# Patient Record
Sex: MALE | Race: WHITE | Employment: OTHER | URBAN - METROPOLITAN AREA
[De-identification: names, ages, dates, MRNs, and addresses within clinical notes are randomized per-mention and may not be internally consistent; named-entity substitution may affect disease eponyms.]

---

## 2017-02-13 ENCOUNTER — HOSPITAL ENCOUNTER (INPATIENT)
Age: 77
LOS: 1 days | Discharge: HOME | DRG: 378 | End: 2017-02-15
Attending: EMERGENCY MEDICINE | Admitting: INTERNAL MEDICINE
Payer: COMMERCIAL

## 2017-02-13 DIAGNOSIS — K92.2 UPPER GI BLEED: Primary | ICD-10-CM

## 2017-02-13 DIAGNOSIS — N17.9 ACUTE KIDNEY INJURY (HCC): ICD-10-CM

## 2017-02-13 LAB
ALBUMIN SERPL BCP-MCNC: 3.7 G/DL (ref 3.2–4.6)
ALBUMIN/GLOB SERPL: 0.8 {RATIO} (ref 1.2–3.5)
ALP SERPL-CCNC: 97 U/L (ref 50–136)
ALT SERPL-CCNC: 21 U/L (ref 12–65)
ANION GAP BLD CALC-SCNC: 14 MMOL/L (ref 7–16)
AST SERPL W P-5'-P-CCNC: 16 U/L (ref 15–37)
BASOPHILS # BLD AUTO: 0.1 K/UL (ref 0–0.2)
BASOPHILS # BLD: 1 % (ref 0–2)
BILIRUB DIRECT SERPL-MCNC: 0.1 MG/DL
BILIRUB SERPL-MCNC: 0.4 MG/DL (ref 0.2–1.1)
BUN SERPL-MCNC: 52 MG/DL (ref 8–23)
CALCIUM SERPL-MCNC: 8.9 MG/DL (ref 8.3–10.4)
CHLORIDE SERPL-SCNC: 104 MMOL/L (ref 98–107)
CO2 SERPL-SCNC: 21 MMOL/L (ref 21–32)
CREAT SERPL-MCNC: 1.89 MG/DL (ref 0.8–1.5)
DIFFERENTIAL METHOD BLD: ABNORMAL
EOSINOPHIL # BLD: 0.5 K/UL (ref 0–0.8)
EOSINOPHIL NFR BLD: 5 % (ref 0.5–7.8)
ERYTHROCYTE [DISTWIDTH] IN BLOOD BY AUTOMATED COUNT: 13.6 % (ref 11.9–14.6)
GLOBULIN SER CALC-MCNC: 4.4 G/DL (ref 2.3–3.5)
GLUCOSE SERPL-MCNC: 195 MG/DL (ref 65–100)
HCT VFR BLD AUTO: 36.7 % (ref 41.1–50.3)
HGB BLD-MCNC: 12 G/DL (ref 13.6–17.2)
IMM GRANULOCYTES # BLD: 0 K/UL (ref 0–0.5)
IMM GRANULOCYTES NFR BLD AUTO: 0.3 % (ref 0–5)
LYMPHOCYTES # BLD AUTO: 32 % (ref 13–44)
LYMPHOCYTES # BLD: 3.4 K/UL (ref 0.5–4.6)
MCH RBC QN AUTO: 30.2 PG (ref 26.1–32.9)
MCHC RBC AUTO-ENTMCNC: 32.7 G/DL (ref 31.4–35)
MCV RBC AUTO: 92.2 FL (ref 79.6–97.8)
MONOCYTES # BLD: 0.8 K/UL (ref 0.1–1.3)
MONOCYTES NFR BLD AUTO: 7 % (ref 4–12)
NEUTS SEG # BLD: 5.8 K/UL (ref 1.7–8.2)
NEUTS SEG NFR BLD AUTO: 55 % (ref 43–78)
PLATELET # BLD AUTO: 396 K/UL (ref 150–450)
PMV BLD AUTO: 10.2 FL (ref 10.8–14.1)
POTASSIUM SERPL-SCNC: 4 MMOL/L (ref 3.5–5.1)
PROT SERPL-MCNC: 8.1 G/DL (ref 6.3–8.2)
RBC # BLD AUTO: 3.98 M/UL (ref 4.23–5.67)
SODIUM SERPL-SCNC: 139 MMOL/L (ref 136–145)
WBC # BLD AUTO: 10.6 K/UL (ref 4.3–11.1)

## 2017-02-13 PROCEDURE — 80076 HEPATIC FUNCTION PANEL: CPT | Performed by: EMERGENCY MEDICINE

## 2017-02-13 PROCEDURE — 86900 BLOOD TYPING SEROLOGIC ABO: CPT | Performed by: EMERGENCY MEDICINE

## 2017-02-13 PROCEDURE — 93005 ELECTROCARDIOGRAM TRACING: CPT | Performed by: EMERGENCY MEDICINE

## 2017-02-13 PROCEDURE — 74011250636 HC RX REV CODE- 250/636: Performed by: EMERGENCY MEDICINE

## 2017-02-13 PROCEDURE — 99285 EMERGENCY DEPT VISIT HI MDM: CPT | Performed by: EMERGENCY MEDICINE

## 2017-02-13 PROCEDURE — 96360 HYDRATION IV INFUSION INIT: CPT | Performed by: EMERGENCY MEDICINE

## 2017-02-13 PROCEDURE — 80048 BASIC METABOLIC PNL TOTAL CA: CPT | Performed by: EMERGENCY MEDICINE

## 2017-02-13 PROCEDURE — 85025 COMPLETE CBC W/AUTO DIFF WBC: CPT | Performed by: EMERGENCY MEDICINE

## 2017-02-13 RX ADMIN — SODIUM CHLORIDE 1000 ML: 900 INJECTION, SOLUTION INTRAVENOUS at 23:42

## 2017-02-13 NOTE — IP AVS SNAPSHOT
[address] [place] [address] 
[phone] Patient: Gilbert Mcgee MRN: [id number] :1940 You are allergic to the following No active allergies Recent Documentation Height Weight BMI Smoking Status 1.803 m 112.9 kg 34.73 kg/m2 Never Smoker Emergency Contacts Name Discharge Info Relation Home Work Mobile Gracia Spence  Spouse [3] [phone] About your hospitalization You were admitted on:  2017 You last received care in the:  [address] You were discharged on:  February 15, 2017 Unit phone number:  [phone] Why you were hospitalized Your primary diagnosis was:  Upper Gi Bleed Your diagnoses also included:  Type Ii Diabetes Mellitus (Hcc), Christopher (Acute Kidney Injury) (Hcc) Providers Seen During Your Hospitalizations Provider Role Specialty Primary office phone Kg Fernandez MD Attending Provider Emergency Medicine [phone] [facility] Demetrio, DO Attending Provider Internal Medicine [phone] Your Primary Care Physician (PCP) Primary Care Physician Office Phone Office Fax João  [phone] Follow-up Information Follow up With Details Comments Contact Info Lucas Bryant MD On 2017 APPT. Muna Hope.  @ 10:00 91 [facility] [address] [place] [address] 
[phone] Neeta Preciado MD In 1 week  [address] [facility] [address] [facility] [address] 
[phone] Current Discharge Medication List  
  
START taking these medications Dose & Instructions Dispensing Information Comments Morning Noon Evening Bedtime  
 ondansetron hcl 4 mg tablet Commonly known as:  Leonora Ring Dose:  4 mg Take 1 Tab by mouth every eight (8) hours as needed for Nausea. Quantity:  10 Tab Refills:  0  
     
   
   
   
  
 pantoprazole 40 mg tablet Commonly known as:  PROTONIX Your next dose is:  Tomorrow Dose:  40 mg Take 1 Tab by mouth daily. Quantity:  30 Tab Refills:  0  
     
  
   
   
   
  
 sucralfate 100 mg/mL suspension Commonly known as:  Tunde Garbe Your next dose is: Today Dose:  1 g Take 10 mL by mouth Before breakfast, lunch, and dinner for 30 days. Quantity:  900 mL Refills:  0 CONTINUE these medications which have NOT CHANGED Dose & Instructions Dispensing Information Comments Morning Noon Evening Bedtime DAILY MULTI-VITAMINS/IRON tablet Generic drug:  multivitamin with iron Your next dose is:  Tomorrow Dose:  1 Tab Take 1 Tab by mouth daily. [facility] vitamin pack Refills:  0 GLIMEPIRIDE PO Your next dose is:  Tomorrow Dose:  2 mg Take 2 mg by mouth daily. Refills:  0  
     
  
   
   
   
  
 linagliptin 5 mg tablet Commonly known as:  Authur Vito Your next dose is:  Tomorrow Dose:  5 mg Take 5 mg by mouth daily. Refills:  0  
     
  
   
   
   
  
 lisinopril 10 mg tablet Commonly known as:  Donnald Code Your next dose is:  Tomorrow Dose:  10 mg Take 10 mg by mouth daily. Refills:  0 METFORMIN PO Your next dose is: Today Dose:  1000 mg Take 1,000 mg by mouth two (2) times a day. Refills:  0 MUCINEX 600 mg SR tablet Generic drug:  guaiFENesin SR Your next dose is: Today Dose:  600 mg Take 600 mg by mouth every evening. Refills:  0  
     
   
   
  
   
  
 simvastatin 20 mg tablet Commonly known as:  ZOCOR Your next dose is: Today Dose:  20 mg Take 20 mg by mouth nightly. Refills:  0 Triamcinolone Acetonide 55 mcg/actuation Aero nasal spray Commonly known as:  NASACORT AQ  
 Your next dose is:  Tomorrow Dose:  1 Spray 1 Villalba by Both Nostrils route daily. Refills:  0 STOP taking these medications   
 aspirin 81 mg tablet  
   
  
 ibuprofen 200 mg tablet Commonly known as:  MOTRIN Where to Get Your Medications Information on where to get these meds will be given to you by the nurse or doctor. ! Ask your nurse or doctor about these medications  
  ondansetron hcl 4 mg tablet  
 pantoprazole 40 mg tablet  
 sucralfate 100 mg/mL suspension Discharge Instructions DISCHARGE SUMMARY from Nurse The following personal items are in your possession at time of discharge: 
 
Dental Appliances: None Visual Aid: None Home Medications: None Clothing: Pants, Shirt, Footwear, Undergarments, Socks Other Valuables: Cell Phone, Eyeglasses, Other (comment) (watch) PATIENT INSTRUCTIONS: 
 
After general anesthesia or intravenous sedation, for 24 hours or while taking prescription Narcotics: · Limit your activities · Do not drive and operate hazardous machinery · Do not make important personal or business decisions · Do  not drink alcoholic beverages · If you have not urinated within 8 hours after discharge, please contact your surgeon on call. Report the following to your surgeon: 
· Excessive pain, swelling, redness or odor of or around the surgical area · Temperature over 100.5 · Nausea and vomiting lasting longer than 4 hours or if unable to take medications · Any signs of decreased circulation or nerve impairment to extremity: change in color, persistent  numbness, tingling, coldness or increase pain · Any questions What to do at Home: 
Recommended activity: Activity as tolerated, per MD 
 
If you experience any of the following symptoms return or worsening of black stools, fever>101, pain unrelieved with medication, nausea/vomiting, shortness of breath, dizziness/fainting, chest pain. , please follow up with your doctor. *  Please give a list of your current medications to your Primary Care Provider. *  Please update this list whenever your medications are discontinued, doses are 
    changed, or new medications (including over-the-counter products) are added. *  Please carry medication information at all times in case of emergency situations. These are general instructions for a healthy lifestyle: No smoking/ No tobacco products/ Avoid exposure to second hand smoke Surgeon General's Warning:  Quitting smoking now greatly reduces serious risk to your health. Obesity, smoking, and sedentary lifestyle greatly increases your risk for illness A healthy diet, regular physical exercise & weight monitoring are important for maintaining a healthy lifestyle You may be retaining fluid if you have a history of heart failure or if you experience any of the following symptoms:  Weight gain of 3 pounds or more overnight or 5 pounds in a week, increased swelling in our hands or feet or shortness of breath while lying flat in bed. Please call your doctor as soon as you notice any of these symptoms; do not wait until your next office visit. Recognize signs and symptoms of STROKE: 
 
F-face looks uneven A-arms unable to move or move unevenly S-speech slurred or non-existent T-time-call 911 as soon as signs and symptoms begin-DO NOT go Back to bed or wait to see if you get better-TIME IS BRAIN. Warning Signs of HEART ATTACK Call 911 if you have these symptoms: 
? Chest discomfort. Most heart attacks involve discomfort in the center of the chest that lasts more than a few minutes, or that goes away and comes back. It can feel like uncomfortable pressure, squeezing, fullness, or pain. ? Discomfort in other areas of the upper body.  Symptoms can include pain or discomfort in one or both arms, the back, neck, jaw, or stomach. ? Shortness of breath with or without chest discomfort. ? Other signs may include breaking out in a cold sweat, nausea, or lightheadedness. Don't wait more than five minutes to call [address]! Fast action can save your life. Calling 911 is almost always the fastest way to get lifesaving treatment. Emergency Medical Services staff can begin treatment when they arrive  up to an hour sooner than if someone gets to the hospital by car. The discharge information has been reviewed with the patient. The patient verbalized understanding. Discharge medications reviewed with the patient and appropriate educational materials and side effects teaching were provided. Upper Gastrointestinal Bleeding: Care Instructions Your Care Instructions The digestive or gastrointestinal tract goes from the mouth to the anus. It is often called the GI tract. Bleeding in the upper GI tract can happen anywhere from the esophagus to the first part of the small intestine. Sometimes it's caused by an ulcer in your stomach. Or it may be caused by blood vessels in your esophagus. Your esophagus is the tube that carries food from your throat to your stomach. Light bleeding may not cause any symptoms at first. But if you continue to bleed for a while, you may feel very weak or tired. Sudden, heavy bleeding means you need to see a doctor right away. This kind of bleeding can be very dangerous. But it can usually be cured or controlled. The doctor may do some tests to find the cause of your bleeding. Follow-up care is a key part of your treatment and safety. Be sure to make and go to all appointments, and call your doctor if you are having problems. It's also a good idea to know your test results and keep a list of the medicines you take. How can you care for yourself at home? · Be safe with medicines. Take your medicines exactly as prescribed. Call your doctor if you think you are having a problem with your medicine. You will get more details on the specific medicines your doctor prescribes. · Do not take aspirin or other anti-inflammatory medicines, such as naproxen (Aleve) or ibuprofen (Advil, Motrin), without talking to your doctor first. Ask your doctor if it is okay to use acetaminophen (Tylenol). · Do not drink alcohol. · The bleeding may make you lose iron. So it's important to eat foods that have a lot of iron. These include red meat, shellfish, poultry, and eggs. They also include beans, raisins, whole-grain breads, and leafy green vegetables. If you want help planning meals, you can meet with a dietitian. When should you call for help? Call 911 anytime you think you may need emergency care. For example, call if: 
· You have sudden, severe belly pain. · You vomit blood or what looks like coffee grounds. · You passed out (lost consciousness). · Your stools are maroon or very bloody. Call your doctor now or seek immediate medical care if: 
· You are dizzy or lightheaded, or you feel like you may faint. · Your stools are black and look like tar. · You have belly pain. · You vomit or have nausea. · You have trouble swallowing, or it hurts when you swallow. Watch closely for changes in your health, and be sure to contact your doctor if you do not get better as expected. Where can you learn more? Go to http://shane-henrietta.info/. Enter Y827 in the search box to learn more about \"Upper Gastrointestinal Bleeding: Care Instructions. \" Current as of: May 27, 2016 Content Version: 11.1 © 0124-2283 [employer]. Care instructions adapted under license by [employer] (which disclaims liability or warranty for this information).  If you have questions about a medical condition or this instruction, always ask your healthcare professional. [place] [address] any warranty or liability for your use of this information. Discharge Orders None Beetailer Announcement We are excited to announce that we are making your provider's discharge notes available to you in Beetailer. You will see these notes when they are completed and signed by the physician that discharged you from your recent hospital stay. If you have any questions or concerns about any information you see in Beetailer, please call the Health Information Department where you were seen or reach out to your Primary Care Provider for more information about your plan of care. Introducing [facility]! Libby Baltazar introduces Beetailer patient portal. Now you can access parts of your medical record, email your doctor's office, and request medication refills online. 1. In your internet browser, go to [email or web address] 2. Click on the First Time User? Click Here link in the Sign In box. You will see the New Member Sign Up page. 3. Enter your Beetailer Access Code exactly as it appears below. You will not need to use this code after youve completed the sign-up process. If you do not sign up before the expiration date, you must request a new code. · Beetailer Access Code: [email or web address] Expires: 5/14/2017  2:23 PM 
 
4. Enter the last four digits of your Social Security Number (xxxx) and Date of Birth (mm/dd/yyyy) as indicated and click Submit. You will be taken to the next sign-up page. 5. Create a Beetailer ID. This will be your Beetailer login ID and cannot be changed, so think of one that is secure and easy to remember. 6. Create a Beetailer password. You can change your password at any time. 7. Enter your Password Reset Question and Answer. This can be used at a later time if you forget your password. 8. Enter your e-mail address.  You will receive e-mail notification when new information is available in Moodsnapt. 9. Click Sign Up. You can now view and download portions of your medical record. 10. Click the Download Summary menu link to download a portable copy of your medical information. If you have questions, please visit the Frequently Asked Questions section of the [employer] website. Remember, [employer] is NOT to be used for urgent needs. For medical emergencies, dial 911. Now available from your iPhone and Android! General Information Please provide this summary of care documentation to your next provider. Patient Signature:  ____________________________________________________________ Date:  ____________________________________________________________  
  
[employer] Provider Signature:  ____________________________________________________________ Date:  ____________________________________________________________

## 2017-02-14 ENCOUNTER — HOSPITAL ENCOUNTER (OUTPATIENT)
Age: 77
Setting detail: OUTPATIENT SURGERY
Discharge: HOME | End: 2017-02-14
Attending: INTERNAL MEDICINE | Admitting: INTERNAL MEDICINE
Payer: COMMERCIAL

## 2017-02-14 ENCOUNTER — ANESTHESIA EVENT (OUTPATIENT)
Dept: ENDOSCOPY | Age: 77
End: 2017-02-14
Payer: COMMERCIAL

## 2017-02-14 ENCOUNTER — SURGERY (OUTPATIENT)
Age: 77
End: 2017-02-14

## 2017-02-14 ENCOUNTER — ANESTHESIA (OUTPATIENT)
Dept: ENDOSCOPY | Age: 77
End: 2017-02-14
Payer: COMMERCIAL

## 2017-02-14 VITALS
RESPIRATION RATE: 16 BRPM | SYSTOLIC BLOOD PRESSURE: 150 MMHG | DIASTOLIC BLOOD PRESSURE: 70 MMHG | TEMPERATURE: 98.6 F | OXYGEN SATURATION: 94 % | HEART RATE: 83 BPM

## 2017-02-14 PROBLEM — E11.9 TYPE II DIABETES MELLITUS (HCC): Status: ACTIVE | Noted: 2017-02-14

## 2017-02-14 PROBLEM — K92.2 UPPER GI BLEED: Status: ACTIVE | Noted: 2017-02-14

## 2017-02-14 PROBLEM — N17.9 AKI (ACUTE KIDNEY INJURY) (HCC): Status: ACTIVE | Noted: 2017-02-14

## 2017-02-14 LAB
ABO + RH BLD: NORMAL
ATRIAL RATE: 101 BPM
BLOOD GROUP ANTIBODIES SERPL: NORMAL
CALCULATED P AXIS, ECG09: NORMAL DEGREES
CALCULATED R AXIS, ECG10: -163 DEGREES
CALCULATED T AXIS, ECG11: 109 DEGREES
DIAGNOSIS, 93000: NORMAL
DIASTOLIC BP, ECG02: NORMAL MMHG
GLUCOSE BLD STRIP.AUTO-MCNC: 116 MG/DL (ref 65–100)
GLUCOSE BLD STRIP.AUTO-MCNC: 118 MG/DL (ref 65–100)
GLUCOSE BLD STRIP.AUTO-MCNC: 224 MG/DL (ref 65–100)
HGB BLD-MCNC: 10 G/DL (ref 13.6–17.2)
P-R INTERVAL, ECG05: 152 MS
Q-T INTERVAL, ECG07: 360 MS
QRS DURATION, ECG06: 100 MS
QTC CALCULATION (BEZET), ECG08: 466 MS
SPECIMEN EXP DATE BLD: NORMAL
SYSTOLIC BP, ECG01: NORMAL MMHG
VENTRICULAR RATE, ECG03: 101 BPM

## 2017-02-14 PROCEDURE — 82962 GLUCOSE BLOOD TEST: CPT

## 2017-02-14 PROCEDURE — C9113 INJ PANTOPRAZOLE SODIUM, VIA: HCPCS | Performed by: INTERNAL MEDICINE

## 2017-02-14 PROCEDURE — 74011250636 HC RX REV CODE- 250/636: Performed by: ANESTHESIOLOGY

## 2017-02-14 PROCEDURE — 74011000250 HC RX REV CODE- 250: Performed by: INTERNAL MEDICINE

## 2017-02-14 PROCEDURE — 74011250636 HC RX REV CODE- 250/636: Performed by: EMERGENCY MEDICINE

## 2017-02-14 PROCEDURE — 76060000031 HC ANESTHESIA FIRST 0.5 HR: Performed by: INTERNAL MEDICINE

## 2017-02-14 PROCEDURE — 76040000025: Performed by: INTERNAL MEDICINE

## 2017-02-14 PROCEDURE — 74011636637 HC RX REV CODE- 636/637: Performed by: NURSE PRACTITIONER

## 2017-02-14 PROCEDURE — 74011000258 HC RX REV CODE- 258: Performed by: EMERGENCY MEDICINE

## 2017-02-14 PROCEDURE — C9113 INJ PANTOPRAZOLE SODIUM, VIA: HCPCS | Performed by: EMERGENCY MEDICINE

## 2017-02-14 PROCEDURE — C9113 INJ PANTOPRAZOLE SODIUM, VIA: HCPCS | Performed by: NURSE PRACTITIONER

## 2017-02-14 PROCEDURE — 74011250637 HC RX REV CODE- 250/637: Performed by: NURSE PRACTITIONER

## 2017-02-14 PROCEDURE — 65270000029 HC RM PRIVATE

## 2017-02-14 PROCEDURE — 74011250636 HC RX REV CODE- 250/636: Performed by: INTERNAL MEDICINE

## 2017-02-14 PROCEDURE — 74011250636 HC RX REV CODE- 250/636

## 2017-02-14 PROCEDURE — 74011000250 HC RX REV CODE- 250

## 2017-02-14 PROCEDURE — 85018 HEMOGLOBIN: CPT | Performed by: INTERNAL MEDICINE

## 2017-02-14 PROCEDURE — 36415 COLL VENOUS BLD VENIPUNCTURE: CPT | Performed by: INTERNAL MEDICINE

## 2017-02-14 PROCEDURE — 74011250636 HC RX REV CODE- 250/636: Performed by: NURSE PRACTITIONER

## 2017-02-14 RX ORDER — LISINOPRIL 5 MG/1
10 TABLET ORAL DAILY
Status: DISCONTINUED | OUTPATIENT
Start: 2017-02-15 | End: 2017-02-15 | Stop reason: HOSPADM

## 2017-02-14 RX ORDER — ALBUTEROL SULFATE 0.83 MG/ML
2.5 SOLUTION RESPIRATORY (INHALATION) AS NEEDED
Status: DISCONTINUED | OUTPATIENT
Start: 2017-02-14 | End: 2017-02-15 | Stop reason: HOSPADM

## 2017-02-14 RX ORDER — MIDAZOLAM HYDROCHLORIDE 1 MG/ML
2 INJECTION, SOLUTION INTRAMUSCULAR; INTRAVENOUS
Status: DISCONTINUED | OUTPATIENT
Start: 2017-02-14 | End: 2017-02-14 | Stop reason: HOSPADM

## 2017-02-14 RX ORDER — SODIUM CHLORIDE 0.9 % (FLUSH) 0.9 %
5-10 SYRINGE (ML) INJECTION AS NEEDED
Status: DISCONTINUED | OUTPATIENT
Start: 2017-02-14 | End: 2017-02-14 | Stop reason: HOSPADM

## 2017-02-14 RX ORDER — HYDROMORPHONE HYDROCHLORIDE 2 MG/ML
0.5 INJECTION, SOLUTION INTRAMUSCULAR; INTRAVENOUS; SUBCUTANEOUS
Status: CANCELLED | OUTPATIENT
Start: 2017-02-14

## 2017-02-14 RX ORDER — MIDAZOLAM HYDROCHLORIDE 1 MG/ML
2 INJECTION, SOLUTION INTRAMUSCULAR; INTRAVENOUS ONCE
Status: DISCONTINUED | OUTPATIENT
Start: 2017-02-14 | End: 2017-02-14 | Stop reason: HOSPADM

## 2017-02-14 RX ORDER — SODIUM CHLORIDE, SODIUM LACTATE, POTASSIUM CHLORIDE, CALCIUM CHLORIDE 600; 310; 30; 20 MG/100ML; MG/100ML; MG/100ML; MG/100ML
100 INJECTION, SOLUTION INTRAVENOUS CONTINUOUS
Status: CANCELLED | OUTPATIENT
Start: 2017-02-14

## 2017-02-14 RX ORDER — SODIUM CHLORIDE 9 MG/ML
100 INJECTION, SOLUTION INTRAVENOUS CONTINUOUS
Status: DISCONTINUED | OUTPATIENT
Start: 2017-02-14 | End: 2017-02-14 | Stop reason: HOSPADM

## 2017-02-14 RX ORDER — SUCRALFATE 1 G/10ML
1 SUSPENSION ORAL
Status: DISCONTINUED | OUTPATIENT
Start: 2017-02-14 | End: 2017-02-15 | Stop reason: HOSPADM

## 2017-02-14 RX ORDER — ONDANSETRON 2 MG/ML
4 INJECTION INTRAMUSCULAR; INTRAVENOUS
Status: DISCONTINUED | OUTPATIENT
Start: 2017-02-14 | End: 2017-02-14 | Stop reason: HOSPADM

## 2017-02-14 RX ORDER — SODIUM CHLORIDE 9 MG/ML
100 INJECTION, SOLUTION INTRAVENOUS CONTINUOUS
Status: DISCONTINUED | OUTPATIENT
Start: 2017-02-14 | End: 2017-02-15 | Stop reason: HOSPADM

## 2017-02-14 RX ORDER — NALOXONE HYDROCHLORIDE 0.4 MG/ML
0.1 INJECTION, SOLUTION INTRAMUSCULAR; INTRAVENOUS; SUBCUTANEOUS AS NEEDED
Status: DISCONTINUED | OUTPATIENT
Start: 2017-02-14 | End: 2017-02-15 | Stop reason: HOSPADM

## 2017-02-14 RX ORDER — OXYCODONE HYDROCHLORIDE 5 MG/1
10 TABLET ORAL
Status: CANCELLED | OUTPATIENT
Start: 2017-02-14

## 2017-02-14 RX ORDER — PANTOPRAZOLE SODIUM 40 MG/10ML
INJECTION, POWDER, LYOPHILIZED, FOR SOLUTION INTRAVENOUS
Status: DISCONTINUED
Start: 2017-02-14 | End: 2017-02-14 | Stop reason: HOSPADM

## 2017-02-14 RX ORDER — SIMVASTATIN 20 MG/1
20 TABLET, FILM COATED ORAL
Status: DISCONTINUED | OUTPATIENT
Start: 2017-02-14 | End: 2017-02-15 | Stop reason: HOSPADM

## 2017-02-14 RX ORDER — ONDANSETRON 2 MG/ML
4 INJECTION INTRAMUSCULAR; INTRAVENOUS ONCE
Status: CANCELLED | OUTPATIENT
Start: 2017-02-14 | End: 2017-02-14

## 2017-02-14 RX ORDER — ALBUTEROL SULFATE 0.83 MG/ML
2.5 SOLUTION RESPIRATORY (INHALATION) AS NEEDED
Status: CANCELLED | OUTPATIENT
Start: 2017-02-14

## 2017-02-14 RX ORDER — SODIUM CHLORIDE 0.9 % (FLUSH) 0.9 %
5-10 SYRINGE (ML) INJECTION AS NEEDED
Status: DISCONTINUED | OUTPATIENT
Start: 2017-02-14 | End: 2017-02-15 | Stop reason: HOSPADM

## 2017-02-14 RX ORDER — PROPOFOL 10 MG/ML
INJECTION, EMULSION INTRAVENOUS AS NEEDED
Status: DISCONTINUED | OUTPATIENT
Start: 2017-02-14 | End: 2017-02-14 | Stop reason: HOSPADM

## 2017-02-14 RX ORDER — DIPHENHYDRAMINE HYDROCHLORIDE 50 MG/ML
12.5 INJECTION, SOLUTION INTRAMUSCULAR; INTRAVENOUS
Status: CANCELLED | OUTPATIENT
Start: 2017-02-14

## 2017-02-14 RX ORDER — LIDOCAINE HYDROCHLORIDE 20 MG/ML
INJECTION, SOLUTION EPIDURAL; INFILTRATION; INTRACAUDAL; PERINEURAL AS NEEDED
Status: DISCONTINUED | OUTPATIENT
Start: 2017-02-14 | End: 2017-02-14 | Stop reason: HOSPADM

## 2017-02-14 RX ORDER — OXYCODONE HYDROCHLORIDE 5 MG/1
10 TABLET ORAL
Status: DISCONTINUED | OUTPATIENT
Start: 2017-02-14 | End: 2017-02-15 | Stop reason: HOSPADM

## 2017-02-14 RX ORDER — SODIUM CHLORIDE 0.9 % (FLUSH) 0.9 %
5-10 SYRINGE (ML) INJECTION EVERY 8 HOURS
Status: DISCONTINUED | OUTPATIENT
Start: 2017-02-14 | End: 2017-02-14 | Stop reason: HOSPADM

## 2017-02-14 RX ORDER — ONDANSETRON 2 MG/ML
4 INJECTION INTRAMUSCULAR; INTRAVENOUS ONCE
Status: COMPLETED | OUTPATIENT
Start: 2017-02-14 | End: 2017-02-14

## 2017-02-14 RX ORDER — ONDANSETRON 2 MG/ML
4 INJECTION INTRAMUSCULAR; INTRAVENOUS
Status: DISCONTINUED | OUTPATIENT
Start: 2017-02-14 | End: 2017-02-15 | Stop reason: HOSPADM

## 2017-02-14 RX ORDER — HYDROMORPHONE HYDROCHLORIDE 2 MG/ML
0.5 INJECTION, SOLUTION INTRAMUSCULAR; INTRAVENOUS; SUBCUTANEOUS
Status: DISCONTINUED | OUTPATIENT
Start: 2017-02-14 | End: 2017-02-15 | Stop reason: HOSPADM

## 2017-02-14 RX ORDER — INSULIN LISPRO 100 [IU]/ML
INJECTION, SOLUTION INTRAVENOUS; SUBCUTANEOUS
Status: DISCONTINUED | OUTPATIENT
Start: 2017-02-14 | End: 2017-02-15 | Stop reason: HOSPADM

## 2017-02-14 RX ORDER — INSULIN LISPRO 100 [IU]/ML
INJECTION, SOLUTION INTRAVENOUS; SUBCUTANEOUS
Status: DISCONTINUED | OUTPATIENT
Start: 2017-02-14 | End: 2017-02-14 | Stop reason: HOSPADM

## 2017-02-14 RX ORDER — DIPHENHYDRAMINE HYDROCHLORIDE 50 MG/ML
12.5 INJECTION, SOLUTION INTRAMUSCULAR; INTRAVENOUS
Status: DISCONTINUED | OUTPATIENT
Start: 2017-02-14 | End: 2017-02-15 | Stop reason: HOSPADM

## 2017-02-14 RX ORDER — FENTANYL CITRATE 50 UG/ML
100 INJECTION, SOLUTION INTRAMUSCULAR; INTRAVENOUS ONCE
Status: DISCONTINUED | OUTPATIENT
Start: 2017-02-14 | End: 2017-02-14 | Stop reason: HOSPADM

## 2017-02-14 RX ORDER — SODIUM CHLORIDE, SODIUM LACTATE, POTASSIUM CHLORIDE, CALCIUM CHLORIDE 600; 310; 30; 20 MG/100ML; MG/100ML; MG/100ML; MG/100ML
100 INJECTION, SOLUTION INTRAVENOUS CONTINUOUS
Status: DISCONTINUED | OUTPATIENT
Start: 2017-02-14 | End: 2017-02-14 | Stop reason: HOSPADM

## 2017-02-14 RX ORDER — NALOXONE HYDROCHLORIDE 0.4 MG/ML
0.1 INJECTION, SOLUTION INTRAMUSCULAR; INTRAVENOUS; SUBCUTANEOUS AS NEEDED
Status: CANCELLED | OUTPATIENT
Start: 2017-02-14

## 2017-02-14 RX ORDER — LIDOCAINE HYDROCHLORIDE 10 MG/ML
0.1 INJECTION INFILTRATION; PERINEURAL AS NEEDED
Status: DISCONTINUED | OUTPATIENT
Start: 2017-02-14 | End: 2017-02-14 | Stop reason: HOSPADM

## 2017-02-14 RX ORDER — NAPROXEN SODIUM 220 MG
220 TABLET ORAL
COMMUNITY
End: 2019-02-24

## 2017-02-14 RX ORDER — SODIUM CHLORIDE, SODIUM LACTATE, POTASSIUM CHLORIDE, CALCIUM CHLORIDE 600; 310; 30; 20 MG/100ML; MG/100ML; MG/100ML; MG/100ML
100 INJECTION, SOLUTION INTRAVENOUS CONTINUOUS
Status: DISCONTINUED | OUTPATIENT
Start: 2017-02-14 | End: 2017-02-15 | Stop reason: HOSPADM

## 2017-02-14 RX ORDER — SODIUM CHLORIDE 0.9 % (FLUSH) 0.9 %
5-10 SYRINGE (ML) INJECTION EVERY 8 HOURS
Status: DISCONTINUED | OUTPATIENT
Start: 2017-02-14 | End: 2017-02-15 | Stop reason: HOSPADM

## 2017-02-14 RX ADMIN — Medication 10 ML: at 01:10

## 2017-02-14 RX ADMIN — Medication 10 ML: at 16:51

## 2017-02-14 RX ADMIN — PROPOFOL 20 MG: 10 INJECTION, EMULSION INTRAVENOUS at 12:14

## 2017-02-14 RX ADMIN — Medication 10 ML: at 05:50

## 2017-02-14 RX ADMIN — SIMVASTATIN 20 MG: 20 TABLET, FILM COATED ORAL at 21:55

## 2017-02-14 RX ADMIN — SUCRALFATE 1 G: 1 SUSPENSION ORAL at 16:50

## 2017-02-14 RX ADMIN — PROPOFOL 10 MG: 10 INJECTION, EMULSION INTRAVENOUS at 12:12

## 2017-02-14 RX ADMIN — SODIUM CHLORIDE 100 ML/HR: 900 INJECTION, SOLUTION INTRAVENOUS at 22:57

## 2017-02-14 RX ADMIN — PROPOFOL 10 MG: 10 INJECTION, EMULSION INTRAVENOUS at 12:10

## 2017-02-14 RX ADMIN — SODIUM CHLORIDE, SODIUM LACTATE, POTASSIUM CHLORIDE, AND CALCIUM CHLORIDE 100 ML/HR: 600; 310; 30; 20 INJECTION, SOLUTION INTRAVENOUS at 10:38

## 2017-02-14 RX ADMIN — GUAIFENESIN 600 MG: 600 TABLET, EXTENDED RELEASE ORAL at 21:55

## 2017-02-14 RX ADMIN — SODIUM CHLORIDE 80 MG: 900 INJECTION, SOLUTION INTRAVENOUS at 03:10

## 2017-02-14 RX ADMIN — LIDOCAINE HYDROCHLORIDE 40 MG: 20 INJECTION, SOLUTION EPIDURAL; INFILTRATION; INTRACAUDAL; PERINEURAL at 12:08

## 2017-02-14 RX ADMIN — SODIUM CHLORIDE, SODIUM LACTATE, POTASSIUM CHLORIDE, AND CALCIUM CHLORIDE: 600; 310; 30; 20 INJECTION, SOLUTION INTRAVENOUS at 12:03

## 2017-02-14 RX ADMIN — SODIUM CHLORIDE 100 ML/HR: 900 INJECTION, SOLUTION INTRAVENOUS at 03:13

## 2017-02-14 RX ADMIN — SODIUM CHLORIDE 80 MG: 9 INJECTION, SOLUTION INTRAMUSCULAR; INTRAVENOUS; SUBCUTANEOUS at 01:11

## 2017-02-14 RX ADMIN — SODIUM CHLORIDE 80 MG: 900 INJECTION, SOLUTION INTRAVENOUS at 03:09

## 2017-02-14 RX ADMIN — SODIUM CHLORIDE 80 MG: 900 INJECTION, SOLUTION INTRAVENOUS at 14:09

## 2017-02-14 RX ADMIN — PROPOFOL 30 MG: 10 INJECTION, EMULSION INTRAVENOUS at 12:08

## 2017-02-14 RX ADMIN — SODIUM CHLORIDE 100 ML/HR: 900 INJECTION, SOLUTION INTRAVENOUS at 15:00

## 2017-02-14 RX ADMIN — ONDANSETRON 4 MG: 2 INJECTION INTRAMUSCULAR; INTRAVENOUS at 14:00

## 2017-02-14 RX ADMIN — INSULIN LISPRO 4 UNITS: 100 INJECTION, SOLUTION INTRAVENOUS; SUBCUTANEOUS at 16:49

## 2017-02-14 NOTE — PROGRESS NOTES
TRANSFER - IN REPORT:    Verbal report received from [address] [place] [address], RN(name) on Víctor Duvall  being received from ED(unit) for routine progression of care      Report consisted of patients Situation, Background, Assessment and   Recommendations(SBAR). Information from the following report(s) SBAR, Kardex, ED Summary, Procedure Summary, Intake/Output, MAR, Accordion, Recent Results and Med Rec Status was reviewed with the receiving nurse. Opportunity for questions and clarification was provided. Assessment will be completed upon patients arrival to unit and care will be assumed.

## 2017-02-14 NOTE — PROCEDURES
BRIEF OPERATIVE NOTE    Date of Procedure: 2/14/2017   Preoperative Diagnosis: Melena [K92.1]  Anemia, unspecified type [D64.9]  Postoperative Diagnosis: multiple gastric ulcers (8), no active bleeding, S/P Fundoplication  Procedure(s):  ESOPHAGOGASTRODUODENOSCOPY (EGD)/ [facility] 343  Surgeon(s) and Role:     * Chavo Navarro MD - Primary  Surgical Staff:  Endoscopy RN-1: Jyothi Banuelos RN  Respiratory Therapist: RT Matt  No case tracking events are documented in the log. Anesthesia: MAC   Estimated Blood Loss: none  Specimens: * No specimens in log *   Findings:   No fresh or old blood seen in UGI tract. Multiple gastric ulcers seen in the body of stomach - likely NSAID induced - no definite infiltrative or neoplastic disease evident. Biopsies not performed secondary to recent significant GI bleeding. Largest ulcer about 18 mm in size and deep with edematous edges and clean white base with single small red spot. No visible vessel, adherent blood clot or definite stigmata of bleeding. No therapeutic intervention performed. Second ulcer about 15 mm in size, less deep and again without stigmata of bleeding. Additional six smaller <10 mm ulcers again without stigmata of bleeding. Retroflexed view with intact Nissen's fundoplication wrap. Esophagus and duodenum appeared normal.  Complications: None. Recommendations:  Avoid all NSAIDs and gastric irritants. Continue PPI therapy. Check H pylori antibodies. Liquid diet when awak and alert. Advance as tolerated. Follow-up EGD in about 8 weeks to confirm healing or biopsy ulcers if refractory.   Implants: * No implants in log *    Chavo Navarro MD

## 2017-02-14 NOTE — H&P (VIEW-ONLY)
[facility] Consult Note    Vi Myers,  1940       Primary GI Physician:     Referring Physician:  Dr Sukhdeep Snider    Consult Date:  2017    Admit Date:  2017    Chief Complaint:  Anemia, melena    Subjective:     History of Present Illness:  Patient is a 67 y.o. male seen in consultation at the request of Dr. Sukhdeep Snider for evaluation of anemia and melena. He presented to the ER at [facility] yesterday reporting light headedness. He noted black stools x 2 days and was mildly tachycardic on admission with hgb 12.0, down to 10 on recheck this morning. He notes use of Aleve, four a day, for arthritis pain but denies abdominal pain with symptoms. He reports prior EGD years ago which was normal.  He does have hx of colon polyps. He has had no prior gi bleed,    PMH:  Past Medical History   Diagnosis Date    Calculus of kidney     Closed left arm fracture 13    Diabetes mellitus type II      doesnt take blood sugars    GERD (gastroesophageal reflux disease)      hx of, no longer    hx of Pleurisy 15 yrs ago    hx of Vitamin K deficiency      with blood transfusion    Hypertension     Hypertrophy of prostate with urinary obstruction and other lower urinary tract symptoms (LUTS)     Personal history of kidney stones     Pure hypercholesterolemia     Type II diabetes mellitus ([facility] [address] 2017    Upper GI bleed 2017    Urgency of urination        PSH:  Past Surgical History   Procedure Laterality Date    Hx other surgical       pleurisy    Hx cholecystectomy       with hiatal hernia repair    Hx hernia repair       inguinal hernia       Allergies:  No Known Allergies    Home Medications:  Prior to Admission medications    Medication Sig Start Date End Date Taking? Authorizing Provider   naproxen sodium (ALEVE) 220 mg tablet Take 220 mg by mouth daily as needed for Pain.    Yes Historical Provider   Triamcinolone Acetonide (NASACORT AQ) 55 mcg/actuation Aero nasal spray 1 [place] by Both Nostrils route daily. Yes Historical Provider   linagliptin (TRADJENTA) 5 mg tablet Take 5 mg by mouth daily. Yes Historical Provider   lisinopril (PRINIVIL, ZESTRIL) 10 mg tablet Take 10 mg by mouth daily. Yes Historical Provider   multivitamin with iron (DAILY MULTI-VITAMINS/IRON) tablet Take 1 Tab by mouth daily. [facility] vitamin pack   Yes Historical Provider   METFORMIN PO Take 1,000 mg by mouth two (2) times a day. Yes Bette Moyer MD   GLIMEPIRIDE PO Take 2 mg by mouth daily. Yes Bette Moyer MD   simvastatin (ZOCOR) 20 mg tablet Take 20 mg by mouth nightly. Historical Provider   ibuprofen (MOTRIN) 200 mg tablet Take 400 mg by mouth every six (6) hours as needed. Last dose 5-2-13    Historical Provider   aspirin 81 mg tablet Take 81 mg by mouth nightly. Historical Provider   guaiFENesin SR (MUCINEX) 600 mg SR tablet Take 600 mg by mouth every evening. Historical Provider       Hospital Medications:  Current Facility-Administered Medications   Medication Dose Route Frequency    lidocaine (XYLOCAINE) 10 mg/mL (1 %) injection 0.1 mL  0.1 mL SubCUTAneous PRN    lactated ringers infusion  100 mL/hr IntraVENous CONTINUOUS    fentaNYL citrate (PF) injection 100 mcg  100 mcg IntraVENous ONCE    midazolam (VERSED) injection 2 mg  2 mg IntraVENous ONCE PRN    midazolam (VERSED) injection 2 mg  2 mg IntraVENous ONCE       Social History:  Social History   Substance Use Topics    Smoking status: Never Smoker    Smokeless tobacco: Never Used    Alcohol use No           Family History:  Family History   Problem Relation Age of Onset    Heart Disease Father     Other Mother      thyroid       Review of Systems:  A detailed 10 system ROS is obtained, with pertinent positives as listed above. All others are negative.     Diet:  npo    Objective:     Physical Exam:  Vitals:  Visit Vitals    /72    Pulse 86    Resp 18    SpO2 99%     Gen:  Pt is alert, cooperative, no acute distress  Skin:  Extremities and face reveal no rashes. HEENT: Sclerae anicteric. Extra-occular muscles are intact. No oral ulcers. No abnormal pigmentation of the lips. The neck is supple. Cardiovascular: Regular rate and rhythm. No murmurs, gallops, or rubs. Respiratory:  Comfortable breathing with no accessory muscle use. Clear breath sounds anteriorly with no wheezes, rales, or rhonchi. GI:  Abdomen nondistended, soft, and nontender. Normal active bowel sounds. No enlargement of the liver or spleen. No masses palpable. Rectal:  Deferred  Musculoskeletal:  No pitting edema of the lower legs. Neurological:  Gross memory appears intact. Patient is alert and oriented. Psychiatric:  Mood appears appropriate with judgement intact. Lymphatic:  No cervical or supraclavicular adenopathy. Laboratory:    Recent Labs      02/14/17   0626  02/13/17   2309   WBC   --   10.6   HGB  10.0*  12.0*   HCT   --   36.7*   PLT   --   396   MCV   --   92.2   NA   --   139   K   --   4.0   CL   --   104   CO2   --   21   BUN   --   52*   CREA   --   1.89*   CA   --   8.9   GLU   --   195*   AP   --   97   SGOT   --   16   ALT   --   21   TBILI   --   0.4   CBIL   --   0.1   ALB   --   3.7   TP   --   8.1          Assessment:     Active Problems:    *Anemia *    Melena    Plan:     68 yo male is seen today in consultation for evaluation of anemia and melena which began on Sunday and persisted. He presented to the ER for dizziness and was admitted by the hospitalist service. Hgb was 12 in the ER, down to 10 this morning. He admits to use of Aleve, four pills a day, for arthritis. He denies prior hx of gi bleed but has had normal EGD in the past, performed with colonoscopy. 1.  Follow hgb, transfuse as needed  2. PPI prophylaxis  3. Avoid nsaids  4. EGD today in evaluation; risks are reviewed with patient    Patient is seen and examined in collaboration with Dr. Annette Ahumada.   Assessment and plan as per  Jose Mcdermott NP      ATTENDING NOTE:  I have seen the patient and agree with the above assessment and plan. Based on patient's history and use of NSAIDs, there is high clinical suspicion for bleeding from peptic ulcer disease. NPO for EGD today.     Mindi Gutierres MD

## 2017-02-14 NOTE — PROGRESS NOTES
Hospitalist Progress Note    2017  Admit Date: 2017 10:54 PM   NAME: Justice Alcantara   :  1940   MRN:  [id number]   Attending: Chito Ng DO  PCP:  Carl Rivera MD  Treatment Team: Attending Provider: Chito Ng DO; Consulting Provider: Nithya Dawson MD; Utilization Review: Maira Arteaga RN      SUBJECTIVE:   Mr. Nadeen Calle is a 68 yo male who presented with c/o dizziness, black stools for 2 days. Reports chronic aleve BID use and ASA daily. He underwent an EGD today, found to have 8 gastric ulcers without active bleeding. Hgb stable. Pt on protonix. Denies n/v/d, abd pain, CP, SOB.        Past Medical History   Diagnosis Date    Calculus of kidney     Closed left arm fracture 13    Diabetes mellitus type II      doesnt take blood sugars    GERD (gastroesophageal reflux disease)      hx of, no longer    hx of Pleurisy 15 yrs ago    hx of Vitamin K deficiency      with blood transfusion    Hypertension     Hypertrophy of prostate with urinary obstruction and other lower urinary tract symptoms (LUTS)     Personal history of kidney stones     Pure hypercholesterolemia     Type II diabetes mellitus ([facility] [address] 2017    Upper GI bleed 2017    Urgency of urination      Recent Results (from the past 24 hour(s))   TYPE & SCREEN    Collection Time: 17 11:09 PM   Result Value Ref Range    Crossmatch Expiration 2017     ABO/Rh(D) Kaylin Shook POSITIVE     Antibody screen NEG    CBC WITH AUTOMATED DIFF    Collection Time: 17 11:09 PM   Result Value Ref Range    WBC 10.6 4.3 - 11.1 K/uL    RBC 3.98 (L) 4.23 - 5.67 M/uL    HGB 12.0 (L) 13.6 - 17.2 g/dL    HCT 36.7 (L) 41.1 - 50.3 %    MCV 92.2 79.6 - 97.8 FL    MCH 30.2 26.1 - 32.9 PG    MCHC 32.7 31.4 - 35.0 g/dL    RDW 13.6 11.9 - 14.6 %    PLATELET 511 990 - 772 K/uL    MPV 10.2 (L) 10.8 - 14.1 FL    DF AUTOMATED      NEUTROPHILS 55 43 - 78 %    LYMPHOCYTES 32 13 - 44 %    MONOCYTES 7 4.0 - 12.0 % EOSINOPHILS 5 0.5 - 7.8 %    BASOPHILS 1 0.0 - 2.0 %    IMMATURE GRANULOCYTES 0.3 0.0 - 5.0 %    ABS. NEUTROPHILS 5.8 1.7 - 8.2 K/UL    ABS. LYMPHOCYTES 3.4 0.5 - 4.6 K/UL    ABS. MONOCYTES 0.8 0.1 - 1.3 K/UL    ABS. EOSINOPHILS 0.5 0.0 - 0.8 K/UL    ABS. BASOPHILS 0.1 0.0 - 0.2 K/UL    ABS. IMM. GRANS. 0.0 0.0 - 0.5 K/UL   METABOLIC PANEL, BASIC    Collection Time: 02/13/17 11:09 PM   Result Value Ref Range    Sodium 139 136 - 145 mmol/L    Potassium 4.0 3.5 - 5.1 mmol/L    Chloride 104 98 - 107 mmol/L    CO2 21 21 - 32 mmol/L    Anion gap 14 7 - 16 mmol/L    Glucose 195 (H) 65 - 100 mg/dL    BUN 52 (H) 8 - 23 MG/DL    Creatinine 1.89 (H) 0.8 - 1.5 MG/DL    GFR est AA 45 (L) >60 ml/min/1.73m2    GFR est non-AA 37 (L) >60 ml/min/1.73m2    Calcium 8.9 8.3 - 10.4 MG/DL   HEPATIC FUNCTION PANEL    Collection Time: 02/13/17 11:09 PM   Result Value Ref Range    Protein, total 8.1 6.3 - 8.2 g/dL    Albumin 3.7 3.2 - 4.6 g/dL    Globulin 4.4 (H) 2.3 - 3.5 g/dL    A-G Ratio 0.8 (L) 1.2 - 3.5      Bilirubin, total 0.4 0.2 - 1.1 MG/DL    Bilirubin, direct 0.1 <0.4 MG/DL    Alk. phosphatase 97 50 - 136 U/L    AST (SGOT) 16 15 - 37 U/L    ALT (SGPT) 21 12 - 65 U/L   EKG, 12 LEAD, INITIAL    Collection Time: 02/13/17 11:20 PM   Result Value Ref Range    Systolic BP  mmHg    Diastolic BP  mmHg    Ventricular Rate 101 BPM    Atrial Rate 101 BPM    P-R Interval 152 ms    QRS Duration 100 ms    Q-T Interval 360 ms    QTC Calculation (Bezet) 466 ms    Calculated P Axis  degrees    Calculated R Axis -163 degrees    Calculated T Axis 109 degrees    Diagnosis       Sinus tachycardia with occasional Premature ventricular complexes  Right superior axis deviation  Nonspecific T wave abnormality  Abnormal ECG  When compared with ECG of 03-JAN-2003 11:11,  Premature ventricular complexes are now Present  Vent.  rate has increased BY  36 BPM  QRS axis Shifted left  T wave inversion no longer evident in Inferior leads  QT has lengthened  Confirmed by Laura Oliveira MD (), KATIANA CHIANG ([facility]) on 2/14/2017 6:48:04 AM     HEMOGLOBIN    Collection Time: 02/14/17  6:26 AM   Result Value Ref Range    HGB 10.0 (L) 13.6 - 17.2 g/dL   GLUCOSE, POC    Collection Time: 02/14/17  9:01 AM   Result Value Ref Range    Glucose (POC) 118 (H) 65 - 100 mg/dL     No Known Allergies  Current Facility-Administered Medications   Medication Dose Route Frequency Provider Last Rate Last Dose    albuterol (PROVENTIL VENTOLIN) nebulizer solution 2.5 mg  2.5 mg Inhalation PRN Isiah Richter MD        diphenhydrAMINE (BENADRYL) injection 12.5 mg  12.5 mg IntraVENous ONCE PRN Isiah Richter MD        HYDROmorphone (PF) (DILAUDID) injection 0.5 mg  0.5 mg IntraVENous Multiple Isiah Richter MD        lactated ringers infusion  100 mL/hr IntraVENous CONTINUOUS Isiah Richter MD        [facility]) injection 0.1 mg  0.1 mg IntraVENous PRN Isiah Richter MD        ondansetron [facility]) injection 4 mg  4 mg IntraVENous ONCE Isiah Richter MD        oxyCODONE IR (ROXICODONE) tablet 10 mg  10 mg Oral ONCE PRN Isiah Richter MD        pantoprazole (PROTONIX) 80 mg in 0.9% sodium chloride 250 mL infusion  80 mg IntraVENous CONTINUOUS Aurelia Atkins NP 25 mL/hr at 02/14/17 1409 80 mg at 02/14/17 1409    0.9% sodium chloride infusion  100 mL/hr IntraVENous CONTINUOUS Aurelia Atkins  mL/hr at 02/14/17 1500 100 mL/hr at 02/14/17 1500    sucralfate (CARAFATE) 100 mg/mL oral suspension 1 g  1 g Oral TIDAC Aurelia Atkins NP         Facility-Administered Medications Ordered in Other Encounters   Medication Dose Route Frequency Provider Last Rate Last Dose    lidocaine (XYLOCAINE) 10 mg/mL (1 %) injection 0.1 mL  0.1 mL SubCUTAneous PRN Isiah Richter MD        lactated ringers infusion  100 mL/hr IntraVENous CONTINUOUS Isiah Richter  mL/hr at 02/14/17 1038      fentaNYL citrate (PF) injection 100 mcg  100 mcg IntraVENous ONCE Niko CALVILLO Ingrid Schmid MD        midazolam (VERSED) injection 2 mg  2 mg IntraVENous ONCE PRN Jaron Bartholomew MD        midazolam (VERSED) injection 2 mg  2 mg IntraVENous ONCE Jaron Bartholomew MD           Review of Systems negative with exception of pertinent positives noted above  PHYSICAL EXAM     Visit Vitals    /78 (BP 1 Location: Right arm, BP Patient Position: At rest)    Pulse 80    Temp 96 °F (35.6 °C)    Resp 16    Wt 112.9 kg (249 lb)    SpO2 96%    BMI 34.73 kg/m2      Temp (24hrs), Av.1 °F (36.2 °C), Min:96 °F (35.6 °C), Max:98.6 °F (37 °C)    Oxygen Therapy  O2 Sat (%): 96 % (17 1331)  Pulse via Oximetry: 94 beats per minute (17 0000)    Intake/Output Summary (Last 24 hours) at 17 1511  Last data filed at 17 1209   Gross per 24 hour   Intake                0 ml   Output                0 ml   Net                0 ml      General: No acute distress    Lungs: CTA bilaterally. Resp even and nonlabored  Heart:  S1S2 present without murmurs rubs gallops. RRR. No edema  Abdomen: Soft, non tender, non distended. BS present  Extremities: Moves ext well  Neurologic:  No focal deficits    Results summary of Diagnostic Studies/Procedures copied from within [facility] EMR:         [address] Problems    Diagnosis Date Noted    Upper GI bleed 2017    Type II diabetes mellitus ([facility] [address] 2017    ROSANGELA (acute kidney injury) ([facility] [address] 2017     Plan:    Upper GI bleed: EGD today showing 8 gastric ulcers without active bleeding. Continue PPI. Start carafate. Awaiting h.pylori antibodies. Hold NSAIDS. Will need f/u EGD in 8 weeks to ensure healing.   Check CBC in AM    Notes, labs, VS, diagnostic testing reviewed  Time spent with pt 30 min    DVT Prophylaxis: SCDs  Plan of Care Discussed with: Supervising MD Dr. Nadege Haywood, care team, pt   Nallely Simms, NP

## 2017-02-14 NOTE — ANESTHESIA PREPROCEDURE EVALUATION
Anesthetic History               Review of Systems / Medical History  Patient summary reviewed, nursing notes reviewed and pertinent labs reviewed    Pulmonary                   Neuro/Psych              Cardiovascular    Hypertension: well controlled              Exercise tolerance: >4 METS     GI/Hepatic/Renal     GERD: well controlled           Endo/Other    Diabetes: well controlled, type 2         Other Findings            Physical Exam    Airway  Mallampati: II  TM Distance: 4 - 6 cm  Neck ROM: normal range of motion   Mouth opening: Normal     Cardiovascular  Regular rate and rhythm,  S1 and S2 normal,  no murmur, click, rub, or gallop             Dental  No notable dental hx       Pulmonary  Breath sounds clear to auscultation               Abdominal         Other Findings            Anesthetic Plan    ASA: 3  Anesthesia type: total IV anesthesia          Induction: Intravenous  Anesthetic plan and risks discussed with: Patient

## 2017-02-14 NOTE — PROGRESS NOTES
Primary Nurse Mireya Shields RN and Amy Kraus RN performed a dual skin assessment on this patient No impairment noted  Franky score is 23. Pt has midline incision scar to abd from previous hernia and gallbladder surgery.

## 2017-02-14 NOTE — CONSULTS
[facility] Consult Note    Patricia Navarro,  1940       Primary GI Physician:     Referring Physician:  Dr Yo Bolaños    Consult Date:  2017    Admit Date:  2017    Chief Complaint:  Anemia, melena    Subjective:     History of Present Illness:  Patient is a 67 y.o. male seen in consultation at the request of Dr. Yo Bolaños for evaluation of anemia and melena. He presented to the ER at [facility] yesterday reporting light headedness. He noted black stools x 2 days and was mildly tachycardic on admission with hgb 12.0, down to 10 on recheck this morning. He notes use of Aleve, four a day, for arthritis pain but denies abdominal pain with symptoms. He reports prior EGD years ago which was normal.  He does have hx of colon polyps. He has had no prior gi bleed,    PMH:  Past Medical History   Diagnosis Date    Calculus of kidney     Closed left arm fracture 13    Diabetes mellitus type II      doesnt take blood sugars    GERD (gastroesophageal reflux disease)      hx of, no longer    hx of Pleurisy 15 yrs ago    hx of Vitamin K deficiency      with blood transfusion    Hypertension     Hypertrophy of prostate with urinary obstruction and other lower urinary tract symptoms (LUTS)     Personal history of kidney stones     Pure hypercholesterolemia     Type II diabetes mellitus ([facility] [address] 2017    Upper GI bleed 2017    Urgency of urination        PSH:  Past Surgical History   Procedure Laterality Date    Hx other surgical       pleurisy    Hx cholecystectomy       with hiatal hernia repair    Hx hernia repair       inguinal hernia       Allergies:  No Known Allergies    Home Medications:  Prior to Admission medications    Medication Sig Start Date End Date Taking? Authorizing Provider   naproxen sodium (ALEVE) 220 mg tablet Take 220 mg by mouth daily as needed for Pain.    Yes Historical Provider   Triamcinolone Acetonide (NASACORT AQ) 55 mcg/actuation Aero nasal spray 1 [place] by Both Nostrils route daily. Yes Historical Provider   linagliptin (TRADJENTA) 5 mg tablet Take 5 mg by mouth daily. Yes Historical Provider   lisinopril (PRINIVIL, ZESTRIL) 10 mg tablet Take 10 mg by mouth daily. Yes Historical Provider   multivitamin with iron (DAILY MULTI-VITAMINS/IRON) tablet Take 1 Tab by mouth daily. [employer] vitamin pack   Yes Historical Provider   METFORMIN PO Take 1,000 mg by mouth two (2) times a day. Yes Bette Moyer MD   GLIMEPIRIDE PO Take 2 mg by mouth daily. Yes Bette Moyer MD   simvastatin (ZOCOR) 20 mg tablet Take 20 mg by mouth nightly. Historical Provider   ibuprofen (MOTRIN) 200 mg tablet Take 400 mg by mouth every six (6) hours as needed. Last dose 5-2-13    Historical Provider   aspirin 81 mg tablet Take 81 mg by mouth nightly. Historical Provider   guaiFENesin SR (MUCINEX) 600 mg SR tablet Take 600 mg by mouth every evening. Historical Provider       Hospital Medications:  Current Facility-Administered Medications   Medication Dose Route Frequency    lidocaine (XYLOCAINE) 10 mg/mL (1 %) injection 0.1 mL  0.1 mL SubCUTAneous PRN    lactated ringers infusion  100 mL/hr IntraVENous CONTINUOUS    fentaNYL citrate (PF) injection 100 mcg  100 mcg IntraVENous ONCE    midazolam (VERSED) injection 2 mg  2 mg IntraVENous ONCE PRN    midazolam (VERSED) injection 2 mg  2 mg IntraVENous ONCE       Social History:  Social History   Substance Use Topics    Smoking status: Never Smoker    Smokeless tobacco: Never Used    Alcohol use No           Family History:  Family History   Problem Relation Age of Onset    Heart Disease Father     Other Mother      thyroid       Review of Systems:  A detailed 10 system ROS is obtained, with pertinent positives as listed above. All others are negative.     Diet:  npo    Objective:     Physical Exam:  Vitals:  Visit Vitals    /72    Pulse 86    Resp 18    SpO2 99%     Gen:  Pt is alert, cooperative, no acute distress  Skin:  Extremities and face reveal no rashes. HEENT: Sclerae anicteric. Extra-occular muscles are intact. No oral ulcers. No abnormal pigmentation of the lips. The neck is supple. Cardiovascular: Regular rate and rhythm. No murmurs, gallops, or rubs. Respiratory:  Comfortable breathing with no accessory muscle use. Clear breath sounds anteriorly with no wheezes, rales, or rhonchi. GI:  Abdomen nondistended, soft, and nontender. Normal active bowel sounds. No enlargement of the liver or spleen. No masses palpable. Rectal:  Deferred  Musculoskeletal:  No pitting edema of the lower legs. Neurological:  Gross memory appears intact. Patient is alert and oriented. Psychiatric:  Mood appears appropriate with judgement intact. Lymphatic:  No cervical or supraclavicular adenopathy. Laboratory:    Recent Labs      02/14/17   0626  02/13/17   2309   WBC   --   10.6   HGB  10.0*  12.0*   HCT   --   36.7*   PLT   --   396   MCV   --   92.2   NA   --   139   K   --   4.0   CL   --   104   CO2   --   21   BUN   --   52*   CREA   --   1.89*   CA   --   8.9   GLU   --   195*   AP   --   97   SGOT   --   16   ALT   --   21   TBILI   --   0.4   CBIL   --   0.1   ALB   --   3.7   TP   --   8.1          Assessment:     Active Problems:    *Anemia *    Melena    Plan:     66 yo male is seen today in consultation for evaluation of anemia and melena which began on Sunday and persisted. He presented to the ER for dizziness and was admitted by the hospitalist service. Hgb was 12 in the ER, down to 10 this morning. He admits to use of Aleve, four pills a day, for arthritis. He denies prior hx of gi bleed but has had normal EGD in the past, performed with colonoscopy. 1.  Follow hgb, transfuse as needed  2. PPI prophylaxis  3. Avoid nsaids  4. EGD today in evaluation; risks are reviewed with patient    Patient is seen and examined in collaboration with Dr. Jaymie Alvarez.   Assessment and plan as per  Gurmeet Waddell NP      ATTENDING NOTE:  I have seen the patient and agree with the above assessment and plan. Based on patient's history and use of NSAIDs, there is high clinical suspicion for bleeding from peptic ulcer disease. NPO for EGD today.     Nithya Dawson MD

## 2017-02-14 NOTE — PROGRESS NOTES
Shift assessment complete. Pt alert and oriented x4, respirations present, even and unlabored, HOB elevated, pt denies any SOB at this time, S1&S2 auscultated, HR regular, abd soft, and tender, active BS in all 4 quadrants, pt is up with assistance to the bathroom, voiding, SCDs in place and functioning, IVF infusing without difficulty, No pressure ulcers or edema noted, pt has old surgical scar to abd, pt denies any pain at this time, pt instructed to call for assistance, pt verbalizes understanding, bed low and locked, side rails x3, call light within reach.

## 2017-02-14 NOTE — DISCHARGE INSTRUCTIONS
Gastrointestinal Esophagogastroduodenoscopy (EGD) - Upper Exam Discharge Instructions    1. [facility] problems or questions. 2. Contact the doctor's office for follow up appointment as directed. 3. Medication may cause drowsiness for several hours, therefore, do not drive or                  operate machinery for remainder of the day. 4. No alcohol today. 5. Ordinarily, you may resume regular diet and activity after exam unless otherwise              specified by your physician. 6. For mild soreness in your throat you may use Cepacol throat lozenges or warm               salt-water gargles as needed. Any additional instructions:  ***     Instructions given to Paul Russell and other family members.   Instructions given by:  Leslie Tavera

## 2017-02-14 NOTE — ED PROVIDER NOTES
HPI Comments: Patient is a 68 yo male who is coming in with 2 episodes of black palate or to stool over the last 2 days. He's felt dizzy at times today particularly when he is up walking. He takes aspirin and does take 2 ibuprofens twice daily. He's had no vomiting no pain. He has not drank as much fluids as usual today. Patient is a 67 y.o. male presenting with hypotension. The history is provided by the patient. Hypotension           Past Medical History:   Diagnosis Date    Calculus of kidney     Closed left arm fracture 4/30/13    Diabetes mellitus type II      doesnt take blood sugars    GERD (gastroesophageal reflux disease)      hx of, no longer    hx of Pleurisy 15 yrs ago    hx of Vitamin K deficiency      with blood transfusion    Hypertension     Hypertrophy of prostate with urinary obstruction and other lower urinary tract symptoms (LUTS)     Personal history of kidney stones     Pure hypercholesterolemia     Urgency of urination        Past Surgical History:   Procedure Laterality Date    Hx other surgical       pleurisy    Hx cholecystectomy       with hiatal hernia repair    Hx hernia repair       inguinal hernia         Family History:   Problem Relation Age of Onset    Heart Disease Father     Other Mother      thyroid       Social History     Social History    Marital status:      Spouse name: N/A    Number of children: N/A    Years of education: N/A     Occupational History    Not on file. Social History Main Topics    Smoking status: Never Smoker    Smokeless tobacco: Never Used    Alcohol use No    Drug use: No    Sexual activity: Not on file     Other Topics Concern    Not on file     Social History Narrative         ALLERGIES: Review of patient's allergies indicates no known allergies. Review of Systems   Constitutional: Negative for chills and fever. Respiratory: Negative for cough, chest tightness, shortness of breath and wheezing. Gastrointestinal: Negative for abdominal pain, anal bleeding, nausea and vomiting. All other systems reviewed and are negative. Vitals:    02/13/17 2315 02/13/17 2330 02/13/17 2345 02/14/17 0000   BP: 112/60 111/58 121/60 114/67   Pulse: (!) 101 100 95 93   Resp: 14 (!) 56 14 23   Temp:       SpO2: 95% 94% 94% 95%   Weight:                Physical Exam   Constitutional: He is oriented to person, place, and time. He appears well-developed and well-nourished. No distress. HENT:   Head: Normocephalic and atraumatic. Eyes: Conjunctivae are normal. No scleral icterus. Neck: Normal range of motion. Neck supple. Cardiovascular: Normal rate, regular rhythm and normal heart sounds. Pulmonary/Chest: Effort normal and breath sounds normal. No stridor. No respiratory distress. He has no wheezes. He has no rales. Abdominal: Soft. There is no tenderness. There is no rebound and no guarding. Genitourinary: Rectal exam shows guaiac positive stool (black stool hemoccult positive). Neurological: He is alert and oriented to person, place, and time. No focal weakness   Skin: Skin is warm and dry. No rash noted. He is not diaphoretic. Psychiatric: He has a normal mood and affect. His behavior is normal.   Nursing note and vitals reviewed. MDM  Number of Diagnoses or Management Options  Diagnosis management comments: I have spoken to GI and the hospitalist for admission. Patient currently stable with improving blood pressure with fluids he does have some acute kidney injury and likely has an upper GI bleed. Maximilian Frazier MD; 2/14/2017 @12:33 AM Voice dictation software was used during the making of this note. This software is not perfect and grammatical and other typographical errors may be present.   This note has not been proofread for errors.  ===================================================================        Amount and/or Complexity of Data Reviewed  Clinical lab tests: ordered and reviewed (Results for orders placed or performed during the hospital encounter of 02/13/17  -CBC WITH AUTOMATED DIFF       Result                                            Value                         Ref Range                       WBC                                               10.6                          4.3 - 11.1 K/uL                 RBC                                               3.98 (L)                      4.23 - 5.67 M/uL                HGB                                               12.0 (L)                      13.6 - 17.2 g/dL                HCT                                               36.7 (L)                      41.1 - 50.3 %                   MCV                                               92.2                          79.6 - 97.8 FL                  MCH                                               30.2                          26.1 - 32.9 PG                  MCHC                                              32.7                          31.4 - 35.0 g/dL                RDW                                               13.6                          11.9 - 14.6 %                   PLATELET                                          396                           150 - 450 K/uL                  MPV                                               10.2 (L)                      10.8 - 14.1 FL                  DF                                                AUTOMATED                                                     NEUTROPHILS                                       55                            43 - 78 %                       LYMPHOCYTES                                       32                            13 - 44 %                       MONOCYTES                                         7                             4.0 - 12.0 %                    EOSINOPHILS                                       5                             0.5 - 7.8 %                     BASOPHILS 1                             0.0 - 2.0 %                     IMMATURE GRANULOCYTES                             0.3                           0.0 - 5.0 %                     ABS. NEUTROPHILS                                  5.8                           1.7 - 8.2 K/UL                  ABS. LYMPHOCYTES                                  3.4                           0.5 - 4.6 K/UL                  ABS. MONOCYTES                                    0.8                           0.1 - 1.3 K/UL                  ABS. EOSINOPHILS                                  0.5                           0.0 - 0.8 K/UL                  ABS. BASOPHILS                                    0.1                           0.0 - 0.2 K/UL                  ABS. IMM.  GRANS.                                  0.0                           0.0 - 0.5 K/UL             -METABOLIC PANEL, BASIC       Result                                            Value                         Ref Range                       Sodium                                            139                           136 - 145 mmol/L                Potassium                                         4.0                           3.5 - 5.1 mmol/L                Chloride                                          104                           98 - 107 mmol/L                 CO2                                               21                            21 - 32 mmol/L                  Anion gap                                         14                            7 - 16 mmol/L                   Glucose                                           195 (H)                       65 - 100 mg/dL                  BUN                                               52 (H)                        8 - 23 MG/DL                    Creatinine                                        1.89 (H)                      0.8 - 1.5 MG/DL                 GFR est AA                                        45 (L)                        >60 ml/min/1.73m2               GFR est non-AA                                    37 (L)                        >60 ml/min/1.73m2               Calcium                                           8.9                           8.3 - 10.4 MG/DL           -HEPATIC FUNCTION PANEL       Result                                            Value                         Ref Range                       Protein, total                                    8.1                           6.3 - 8.2 g/dL                  Albumin                                           3.7                           3.2 - 4.6 g/dL                  Globulin                                          4.4 (H)                       2.3 - 3.5 g/dL                  A-G Ratio                                         0.8 (L)                       1.2 - 3.5                       Bilirubin, total                                  0.4                           0.2 - 1.1 MG/DL                 Bilirubin, direct                                 0.1                           <0.4 MG/DL                      Alk. phosphatase                                  97                            50 - 136 U/L                    AST (SGOT)                                        16                            15 - 37 U/L                     ALT (SGPT)                                        21                            12 - 65 U/L                -TYPE & SCREEN       Result                                            Value                         Ref Range                       Crossmatch Expiration                             02/16/2017                                                    ABO/Rh(D)                                         O POSITIVE                                                    Antibody screen                                   NEG                                                     )  Discuss the patient with other providers: yes      ED Course Procedures

## 2017-02-14 NOTE — ED TRIAGE NOTES
Pt reports he was sent here for hypotension by [facility]. Pt reports periods of dizziness.      Papo Vance RN

## 2017-02-14 NOTE — H&P
HOSPITALIST H&P    NAME:  Mer Watkins   Age:  67 y.o.  :   1940   MRN:   [id number]  PCP: Michelle Harmon MD  Chief complaint: dizziness    Subjective:     Patient is a 67 y.o. male who presents with dizziness. Pt has been having black stool for the past 2 days. He denies any red blood in his stool, nausea, vomiting, abdominal pain, fever, chills, chest pain or dyspnea. He takes Aleve BID for arthritis pain and also takes ASA 81mg daily. He was feeling a bit dizzy today and decided to come to the ED. He denies h/o GI bleeding, GERD, or ulcers in the past.    Past Medical History   Diagnosis Date    Calculus of kidney     Closed left arm fracture 13    Diabetes mellitus type II      doesnt take blood sugars    GERD (gastroesophageal reflux disease)      hx of, no longer    hx of Pleurisy 15 yrs ago    hx of Vitamin K deficiency      with blood transfusion    Hypertension     Hypertrophy of prostate with urinary obstruction and other lower urinary tract symptoms (LUTS)     Personal history of kidney stones     Pure hypercholesterolemia     Type II diabetes mellitus ([facility] [address] 2017    Upper GI bleed 2017    Urgency of urination        Past Surgical History   Procedure Laterality Date    Hx other surgical       pleurisy    Hx cholecystectomy       with hiatal hernia repair    Hx hernia repair       inguinal hernia       No current facility-administered medications on file prior to encounter. Current Outpatient Prescriptions on File Prior to Encounter   Medication Sig Dispense Refill    simvastatin (ZOCOR) 20 mg tablet Take 20 mg by mouth nightly.  ibuprofen (MOTRIN) 200 mg tablet Take 400 mg by mouth every six (6) hours as needed. Last dose 52-13      Triamcinolone Acetonide (NASACORT AQ) 55 mcg/actuation Aero nasal spray 1 [place] by Both Nostrils route daily.  aspirin 81 mg tablet Take 81 mg by mouth nightly.       linagliptin (TRADJENTA) 5 mg tablet Take 5 mg by mouth daily.  lisinopril (PRINIVIL, ZESTRIL) 10 mg tablet Take 10 mg by mouth daily.  guaiFENesin SR (MUCINEX) 600 mg SR tablet Take 600 mg by mouth every evening.  multivitamin with iron (DAILY MULTI-VITAMINS/IRON) tablet Take 1 Tab by mouth daily. [employer] vitamin pack      METFORMIN PO Take 1,000 mg by mouth two (2) times a day.  GLIMEPIRIDE PO Take 2 mg by mouth daily. No Known Allergies    Social History   Substance Use Topics    Smoking status: Never Smoker    Smokeless tobacco: Never Used    Alcohol use No       Family History   Problem Relation Age of Onset    Heart Disease Father     Other Mother      thyroid       I have personally reviewed and reconciled patients history. Review of Systems  A comprehensive 12 point review of systems is negative other than what is listed above. Objective:     Patient Vitals for the past 24 hrs:   BP Temp Pulse Resp SpO2 Weight   02/14/17 0000 114/67 - 93 23 95 % -   02/13/17 2345 121/60 - 95 14 94 % -   02/13/17 2330 111/58 - 100 (!) 56 94 % -   02/13/17 2315 112/60 - (!) 101 14 95 % -   02/13/17 2159 98/57 98.1 °F (36.7 °C) (!) 110 16 96 % 112.9 kg (249 lb)       Exam:  General: awake, alert, no apparent distress  Eyes: anicteric  Neck: Supple, trachea midline  Lungs: Clear to auscultation bilaterally. No rales, wheezes, or rhonchi. Heart: Regular rate and rhythm. No appreciable murmur. Abdomen: Soft, nontender, nondistended. Bowel sounds normal.  No rebound tenderness, guarding, or rigidity. Extremities:  No LE edema. Skin: Warm/dry. No rashes or lesions. Neurologic: CN II-XII grossly intact bilaterally. Psych: AOx3. Normal mood and affect.       Data Review (Labs):   Recent Results (from the past 24 hour(s))   TYPE & SCREEN    Collection Time: 02/13/17 11:09 PM   Result Value Ref Range    Crossmatch Expiration 02/16/2017     ABO/Rh(D) O POSITIVE     Antibody screen NEG    CBC WITH AUTOMATED DIFF    Collection Time: 02/13/17 11:09 PM   Result Value Ref Range    WBC 10.6 4.3 - 11.1 K/uL    RBC 3.98 (L) 4.23 - 5.67 M/uL    HGB 12.0 (L) 13.6 - 17.2 g/dL    HCT 36.7 (L) 41.1 - 50.3 %    MCV 92.2 79.6 - 97.8 FL    MCH 30.2 26.1 - 32.9 PG    MCHC 32.7 31.4 - 35.0 g/dL    RDW 13.6 11.9 - 14.6 %    PLATELET 824 180 - 272 K/uL    MPV 10.2 (L) 10.8 - 14.1 FL    DF AUTOMATED      NEUTROPHILS 55 43 - 78 %    LYMPHOCYTES 32 13 - 44 %    MONOCYTES 7 4.0 - 12.0 %    EOSINOPHILS 5 0.5 - 7.8 %    BASOPHILS 1 0.0 - 2.0 %    IMMATURE GRANULOCYTES 0.3 0.0 - 5.0 %    ABS. NEUTROPHILS 5.8 1.7 - 8.2 K/UL    ABS. LYMPHOCYTES 3.4 0.5 - 4.6 K/UL    ABS. MONOCYTES 0.8 0.1 - 1.3 K/UL    ABS. EOSINOPHILS 0.5 0.0 - 0.8 K/UL    ABS. BASOPHILS 0.1 0.0 - 0.2 K/UL    ABS. IMM. GRANS. 0.0 0.0 - 0.5 K/UL   METABOLIC PANEL, BASIC    Collection Time: 02/13/17 11:09 PM   Result Value Ref Range    Sodium 139 136 - 145 mmol/L    Potassium 4.0 3.5 - 5.1 mmol/L    Chloride 104 98 - 107 mmol/L    CO2 21 21 - 32 mmol/L    Anion gap 14 7 - 16 mmol/L    Glucose 195 (H) 65 - 100 mg/dL    BUN 52 (H) 8 - 23 MG/DL    Creatinine 1.89 (H) 0.8 - 1.5 MG/DL    GFR est AA 45 (L) >60 ml/min/1.73m2    GFR est non-AA 37 (L) >60 ml/min/1.73m2    Calcium 8.9 8.3 - 10.4 MG/DL   HEPATIC FUNCTION PANEL    Collection Time: 02/13/17 11:09 PM   Result Value Ref Range    Protein, total 8.1 6.3 - 8.2 g/dL    Albumin 3.7 3.2 - 4.6 g/dL    Globulin 4.4 (H) 2.3 - 3.5 g/dL    A-G Ratio 0.8 (L) 1.2 - 3.5      Bilirubin, total 0.4 0.2 - 1.1 MG/DL    Bilirubin, direct 0.1 <0.4 MG/DL    Alk.  phosphatase 97 50 - 136 U/L    AST (SGOT) 16 15 - 37 U/L    ALT (SGPT) 21 12 - 65 U/L       Assessment:   Principal Problem:    Upper GI bleed (2/14/2017)    Active Problems:    Type II diabetes mellitus ([facility] [address] (2/14/2017)      [facility] (2/14/2017)        Plan:     Admit to medical floor  IVF  Consult GI  Monitor H/H  Protonix    DVT prophylaxis: SCD's  Code Status: FULL    Plan of care discussed with: patient/family  Time spent on patient care: 30 minutes  Anticipated date of discharge: 3 days    Andrew Tavarez DO

## 2017-02-14 NOTE — ANESTHESIA POSTPROCEDURE EVALUATION
Post-Anesthesia Evaluation and Assessment    Patient: Gabe Shelton MRN: [id number]  SSN: [id number]    YOB: 1940  Age: 67 y.o. Sex: male       Cardiovascular Function/Vital Signs  Visit Vitals    /56    Pulse 79    Temp 37 °C (98.6 °F)    Resp 16    SpO2 99%       Patient is status post total IV anesthesia anesthesia for Procedure(s):  ESOPHAGOGASTRODUODENOSCOPY (EGD)/ [address] [place] [id number]. Nausea/Vomiting: None    Postoperative hydration reviewed and adequate. Pain:  Pain Scale 1: Numeric (0 - 10) (02/14/17 1033)  Pain Intensity 1: 0 (02/14/17 1033)   Managed    Neurological Status: At baseline    Mental Status and Level of Consciousness: Arousable    Pulmonary Status:   O2 Device: Nasal cannula (02/14/17 1224)   Adequate oxygenation and airway patent    Complications related to anesthesia: None    Post-anesthesia assessment completed.  No concerns    Signed By: Ivan Smith MD     February 14, 2017

## 2017-02-14 NOTE — PROGRESS NOTES
Patient resting in bed with no complaints, denies any pain except arthritis pain. Ambulating to bathroom at intervals. Alert and oriented times 4. Skin intact. Room air. Saline well intact to left antecubital area with fluids infusing. Instructed to call for assistance. Bowel sounds noted.

## 2017-02-14 NOTE — INTERVAL H&P NOTE
H&P Update:  Vandana Rodriguez was seen and examined. History and physical has been reviewed. The patient has been examined.  There have been no significant clinical changes since the completion of the originally dated History and Physical.    Signed By: George Amaral MD     February 14, 2017 12:06 PM

## 2017-02-14 NOTE — PROGRESS NOTES
TRANSFER - IN REPORT:    Verbal report received from LIYAH Perdue(name) on Roney Cargo  being received from [facility]unit[facility] for routine progression of care      Report consisted of patients Situation, Background, Assessment and   Recommendations(SBAR). Information from the following report(s) Kardex was reviewed with the receiving nurse. Opportunity for questions and clarification was provided. Assessment completed upon patients arrival to unit and care assumed.

## 2017-02-14 NOTE — PROGRESS NOTES
TRANSFER - OUT REPORT:    Verbal report given to shun Mccray(name) on Encino Masker  [facility]       Report consisted of patients Situation, Background, Assessment and   Recommendations(SBAR). Information from the following report(s) Kardex was reviewed with the receiving nurse. Lines:   Peripheral IV 02/13/17 Left Antecubital (Active)   Site Assessment Clean, dry, & intact 2/14/2017  1:43 AM   Phlebitis Assessment 0 2/14/2017  1:43 AM   Infiltration Assessment 0 2/14/2017  1:43 AM   Dressing Status Clean, dry, & intact 2/14/2017  1:43 AM   Dressing Type Tape;Transparent 2/14/2017  1:43 AM   Hub Color/Line Status Helen Sprawls; Infusing;Flushed;Patent 2/14/2017  1:43 AM   Alcohol Cap Used No 2/14/2017  1:43 AM        Opportunity for questions and clarification was provided.       Patient transported with:

## 2017-02-14 NOTE — PROGRESS NOTES
Received from [address] after EGD with stomach ulcer found per report nurse. Ambulating to bathroom to void at intervals. Restarted normal saline at 100cc per hour and protonix drip at 25cc per hour via left antecubital area. Visitor at bedside. Room air noted. Denies any pain. Instructed to call for assistance.

## 2017-02-14 NOTE — PROGRESS NOTES
TRANSFER - IN REPORT:    Verbal report received from LIYAH LUTZ (name) on Mirna Andrews  being received from [place], room 343 (unit) for ordered procedure. Report consisted of patients Situation, Background, Assessment and   Recommendations(SBAR). Information from the following report(s) SBAR was reviewed with the primary nurse. Opportunity for questions and clarification was provided. Awaiting transport to bring pt to the GI Lab from [place] at this time.

## 2017-02-14 NOTE — PROCEDURES
[address]   PROCEDURE NOTE       Name:  Shanon Olivera   MR#:  [id number]   :  1940   Account #:  [de-identified]   Date of Adm:  2017       DATE OF PROCEDURE: 2017    REFERRING PHYSICIAN: Dr. Marlen Humphreys     PROCEDURE: Esophagogastroduodenoscopy (diagnostic). INDICATIONS   1. GI bleeding with melena. 2. Anemia secondary to suspected GI blood loss. MEDICATIONS USED: The patient received IV anesthesia to include   Propofol. Please see anesthesiology records for details. INSTRUMENT USED: [id number]. PROCEDURE NOTE: After required fasting and informed consent, the   patient was transferred from the [facility] for the procedure. Informed consent was obtained. The   patient was brought to the endoscopy suite and placed in   standard left lateral position. He received IV anesthesia and   remained comfortable throughout the procedure. Olympus video   endoscope was advanced under direct vision. Normal mucosa seen   in the pharynx, larynx, and esophagus. The stomach was easily   entered. No fresh or old blood was seen. Pylorus was identified   and intubated. Duodenal bulb and descending duodenum appeared   normal. The endoscope was then withdrawn back into the stomach   which was insufflated with air for optimal visualization of   gastric mucosa. Retroflexed view of the cardia showed an intact   Nissen fundoplication wrap. Multiple ulcers were seen in the   body of the stomach, likely NSAID induced with no definite   features of infiltrative or neoplastic disease. The largest   ulcer was about 18 mm in size, with edematous edges and deep,   but clean white base with a single small red spot without   evidence of visible vessel, blood clots or other stigmata of   bleeding. A second ulcer was approximately 15 mm in size,   less deep than the other one, but again without stigmata of   bleeding.  Additionally, 6 smaller, less than 10 mm sized ulcers were seen without stigmata of bleeding. No biopsies were   performed secondary to recent GI bleeding. The esophagus again   appeared unremarkable on withdrawal. The patient tolerated the   procedure well. IMPRESSION:   1. Multiple (8 gastric ulcers) as described with no active   bleeding nor stigmata of acute bleed. 2. Intact Nissen fundoplication wrap. 3. Otherwise, unremarkable upper endoscopy with no active   bleeding identified at the time of upper endoscopy. RECOMMENDATIONS   1. Continue PPI therapy. 2. Avoid all NSAIDs and other gastric irritants. 3. Check H pylori antibodies. 4. Liquid diet when awake and alert. Advance as tolerated. 5. Followup EGD in about 8 weeks to confirm healing or biopsy   the ulcers if they are refractory. 6. Dr. Kota Soto rounding on inpatients, will follow.             MD DERICK Galloway / Pradeep Weber   D:  02/14/2017   14:11   T:  02/14/2017   14:42   Job #:  [id number]

## 2017-02-14 NOTE — PROGRESS NOTES
TRANSFER - OUT REPORT:    Verbal report given to Nemlgrabiel(name) on Gilbert Mcgee  being transferred to [facility](unit) for routine progression of care       Report consisted of patients Situation, Background, Assessment and   Recommendations(SBAR). Information from the following report(s) Procedure Summary was reviewed with the receiving nurse. Lines:   Peripheral IV 02/13/17 Left Antecubital (Active)   Site Assessment Clean, dry, & intact 2/14/2017  1:43 AM   Phlebitis Assessment 0 2/14/2017  1:43 AM   Infiltration Assessment 0 2/14/2017  1:43 AM   Dressing Status Clean, dry, & intact 2/14/2017  1:43 AM   Dressing Type Tape;Transparent 2/14/2017  1:43 AM   Hub Color/Line Status Elena Babinski; Infusing;Flushed;Patent 2/14/2017  1:43 AM   Alcohol Cap Used No 2/14/2017  1:43 AM        Opportunity for questions and clarification was provided. Patient transported with:  [employer] service. Dr. Shoaib Nuñez talked with pt prior to being transported back.

## 2017-02-14 NOTE — PROGRESS NOTES
Problem: Nutrition Deficit  Goal: *Optimize nutritional status  Nutrition  Reason for assessment: Referral received from nursing admission Malnutrition Screening Tool for recently lost 2-13# without trying and eating poorly due to decreased appetite. Assessment:   Diet order(s): Consistent CHO, GI soft effective supper tonight; clear liquids lunch today. Food/Nutrition Patient History:  Pt presented with dizziness, black stools X 2 days; principle problem-GI bleed. Past medical history notable for DM-11. Pt endorses few pounds weight loss due to recent illness; not eating past day. Prior to recent illness, pt endorses good po intake; enjoys biscuits at breakfast from fast food establishments. Pt tolerated clear liquids this afternoon. Pertinent Labs:  POC glucose today 118 mg/dl, low GFR  Pertinent Rx:  IVF:  0.9% sodium chloride at 100 ml/hr, Januvia, Humalog  Anthropometrics:Height: 5' 11\" (180.3 cm),  Weight: 112.9 kg (249 lb), Weight source not specified, Body mass index is 34.73 kg/(m^2). BMI class of Obesity Class 1. We cannot accept a stated weight when unintentional weight loss is reported. The patient must be weighed. Macronutrient needs:  EER:  6949-2111 kcal /day (12-15 kcal/kg BW)  EPR:  78-94 grams protein/day (1-1.2 grams/kg IBW) GFR 37  Intake/Comparative Standards: Per RD meal rounds: 85% clear liquid lunch. Clear liquid diet does not meet estimated kcal or protein needs. Nutrition Diagnosis: Inadequate oral intake r/t decreased ability to consume sufficient oral intake as evidenced by dietary restriction of NPO / Clear Liquids. Intervention:  1. Meals and snacks: Diet advanced to Consistent CHO, GI Soft; assisted with supper and breakfast menu selection. 2.  Coordination of Nutrition Care:  Interdisciplinary Rounds, Dru Dawson, RN  3.   Ordered weight  Lakeisha Nelli, [address], [place], MPH  [phone]

## 2017-02-15 VITALS
SYSTOLIC BLOOD PRESSURE: 185 MMHG | DIASTOLIC BLOOD PRESSURE: 90 MMHG | RESPIRATION RATE: 18 BRPM | OXYGEN SATURATION: 93 % | WEIGHT: 249 LBS | TEMPERATURE: 97.3 F | HEART RATE: 97 BPM | HEIGHT: 71 IN | BODY MASS INDEX: 34.86 KG/M2

## 2017-02-15 LAB
ANION GAP BLD CALC-SCNC: 11 MMOL/L (ref 7–16)
BASOPHILS # BLD AUTO: 0 K/UL (ref 0–0.2)
BASOPHILS # BLD: 0 % (ref 0–2)
BUN SERPL-MCNC: 17 MG/DL (ref 8–23)
CALCIUM SERPL-MCNC: 8 MG/DL (ref 8.3–10.4)
CHLORIDE SERPL-SCNC: 108 MMOL/L (ref 98–107)
CO2 SERPL-SCNC: 23 MMOL/L (ref 21–32)
CREAT SERPL-MCNC: 1.05 MG/DL (ref 0.8–1.5)
DIFFERENTIAL METHOD BLD: ABNORMAL
EOSINOPHIL # BLD: 0.6 K/UL (ref 0–0.8)
EOSINOPHIL NFR BLD: 8 % (ref 0.5–7.8)
ERYTHROCYTE [DISTWIDTH] IN BLOOD BY AUTOMATED COUNT: 13.7 % (ref 11.9–14.6)
GLUCOSE BLD STRIP.AUTO-MCNC: 165 MG/DL (ref 65–100)
GLUCOSE BLD STRIP.AUTO-MCNC: 175 MG/DL (ref 65–100)
GLUCOSE SERPL-MCNC: 146 MG/DL (ref 65–100)
HCT VFR BLD AUTO: 30.2 % (ref 41.1–50.3)
HGB BLD-MCNC: 10 G/DL (ref 13.6–17.2)
IMM GRANULOCYTES # BLD: 0 K/UL (ref 0–0.5)
IMM GRANULOCYTES NFR BLD AUTO: 0.1 % (ref 0–5)
LYMPHOCYTES # BLD AUTO: 26 % (ref 13–44)
LYMPHOCYTES # BLD: 1.7 K/UL (ref 0.5–4.6)
MCH RBC QN AUTO: 30.3 PG (ref 26.1–32.9)
MCHC RBC AUTO-ENTMCNC: 33.1 G/DL (ref 31.4–35)
MCV RBC AUTO: 91.5 FL (ref 79.6–97.8)
MONOCYTES # BLD: 0.4 K/UL (ref 0.1–1.3)
MONOCYTES NFR BLD AUTO: 6 % (ref 4–12)
NEUTS SEG # BLD: 4 K/UL (ref 1.7–8.2)
NEUTS SEG NFR BLD AUTO: 60 % (ref 43–78)
PLATELET # BLD AUTO: 270 K/UL (ref 150–450)
PMV BLD AUTO: 10 FL (ref 10.8–14.1)
POTASSIUM SERPL-SCNC: 3.9 MMOL/L (ref 3.5–5.1)
RBC # BLD AUTO: 3.3 M/UL (ref 4.23–5.67)
SODIUM SERPL-SCNC: 142 MMOL/L (ref 136–145)
WBC # BLD AUTO: 6.8 K/UL (ref 4.3–11.1)

## 2017-02-15 PROCEDURE — 80048 BASIC METABOLIC PNL TOTAL CA: CPT | Performed by: NURSE PRACTITIONER

## 2017-02-15 PROCEDURE — 82962 GLUCOSE BLOOD TEST: CPT

## 2017-02-15 PROCEDURE — 74011250636 HC RX REV CODE- 250/636: Performed by: NURSE PRACTITIONER

## 2017-02-15 PROCEDURE — 36415 COLL VENOUS BLD VENIPUNCTURE: CPT | Performed by: NURSE PRACTITIONER

## 2017-02-15 PROCEDURE — 85025 COMPLETE CBC W/AUTO DIFF WBC: CPT | Performed by: NURSE PRACTITIONER

## 2017-02-15 PROCEDURE — 74011250637 HC RX REV CODE- 250/637: Performed by: NURSE PRACTITIONER

## 2017-02-15 PROCEDURE — C9113 INJ PANTOPRAZOLE SODIUM, VIA: HCPCS | Performed by: NURSE PRACTITIONER

## 2017-02-15 PROCEDURE — 74011636637 HC RX REV CODE- 636/637: Performed by: NURSE PRACTITIONER

## 2017-02-15 RX ORDER — ONDANSETRON 4 MG/1
4 TABLET, FILM COATED ORAL
Qty: 10 TAB | Refills: 0 | Status: SHIPPED | OUTPATIENT
Start: 2017-02-15 | End: 2019-02-24

## 2017-02-15 RX ORDER — PANTOPRAZOLE SODIUM 40 MG/1
40 TABLET, DELAYED RELEASE ORAL DAILY
Qty: 30 TAB | Refills: 0 | Status: SHIPPED | OUTPATIENT
Start: 2017-02-15 | End: 2019-02-24

## 2017-02-15 RX ORDER — SUCRALFATE 1 G/10ML
1 SUSPENSION ORAL
Qty: 900 ML | Refills: 0 | Status: SHIPPED | OUTPATIENT
Start: 2017-02-15 | End: 2017-03-17

## 2017-02-15 RX ADMIN — SODIUM CHLORIDE 100 ML/HR: 900 INJECTION, SOLUTION INTRAVENOUS at 04:46

## 2017-02-15 RX ADMIN — SODIUM CHLORIDE 80 MG: 900 INJECTION, SOLUTION INTRAVENOUS at 00:57

## 2017-02-15 RX ADMIN — GUAIFENESIN 600 MG: 600 TABLET, EXTENDED RELEASE ORAL at 08:14

## 2017-02-15 RX ADMIN — SUCRALFATE 1 G: 1 SUSPENSION ORAL at 08:14

## 2017-02-15 RX ADMIN — ONDANSETRON 4 MG: 2 INJECTION INTRAMUSCULAR; INTRAVENOUS at 08:19

## 2017-02-15 RX ADMIN — SITAGLIPTIN 50 MG: 50 TABLET, FILM COATED ORAL at 08:14

## 2017-02-15 RX ADMIN — LISINOPRIL 10 MG: 5 TABLET ORAL at 08:14

## 2017-02-15 RX ADMIN — INSULIN LISPRO 2 UNITS: 100 INJECTION, SOLUTION INTRAVENOUS; SUBCUTANEOUS at 08:14

## 2017-02-15 NOTE — PROGRESS NOTES
Pt assessment completed. Alert and oriented. Lungs CTA diminished in bases. Heart sounds regular. Abdomen soft and non tender with active bowel sounds. IV present and infusing without difficulty. Pt denies any pain needs at this time. All needs met at this time, will continue to monitor.

## 2017-02-15 NOTE — PROGRESS NOTES
Discharge instructions and prescriptions provided and explained to the pt. Med side effect sheet reviewed. Opportunity for questions provided. Pt is waiting on his wife. Instructed to call once ready to leave.

## 2017-02-15 NOTE — PROGRESS NOTES
GI DAILY PROGRESS NOTE    Admit Date:  2/13/2017    Today's Date:  2/15/2017    CC:  Gastric ulcers. Subjective:     Patient sitting up in chair. Denies overt GI bleeding. No c/o abdominal pain, N/V. Ready for discharge home and awaiting ride. Medications:   Current Facility-Administered Medications   Medication Dose Route Frequency    albuterol (PROVENTIL VENTOLIN) nebulizer solution 2.5 mg  2.5 mg Inhalation PRN    diphenhydrAMINE (BENADRYL) injection 12.5 mg  12.5 mg IntraVENous ONCE PRN    HYDROmorphone (PF) (DILAUDID) injection 0.5 mg  0.5 mg IntraVENous Multiple    lactated ringers infusion  100 mL/hr IntraVENous CONTINUOUS    naloxone (NARCAN) injection 0.1 mg  0.1 mg IntraVENous PRN    oxyCODONE IR (ROXICODONE) tablet 10 mg  10 mg Oral ONCE PRN    pantoprazole (PROTONIX) 80 mg in 0.9% sodium chloride 250 mL infusion  80 mg IntraVENous CONTINUOUS    0.9% sodium chloride infusion  100 mL/hr IntraVENous CONTINUOUS    SITagliptin (JANUVIA) tablet 50 mg  50 mg Oral DAILY    lisinopril (PRINIVIL, ZESTRIL) tablet 10 mg  10 mg Oral DAILY    simvastatin (ZOCOR) tablet 20 mg  20 mg Oral QHS    sodium chloride (NS) flush 5-10 mL  5-10 mL IntraVENous Q8H    sodium chloride (NS) flush 5-10 mL  5-10 mL IntraVENous PRN    ondansetron (ZOFRAN) injection 4 mg  4 mg IntraVENous Q4H PRN    sucralfate (CARAFATE) 100 mg/mL oral suspension 1 g  1 g Oral TIDAC    insulin lispro (HUMALOG) injection   SubCUTAneous AC&HS    guaiFENesin SR (MUCINEX) tablet 600 mg  600 mg Oral Q12H       Review of Systems:  ROS was obtained, with pertinent positives as listed above. No chest pain or SOB.     Diet:  Diabetic consistent carb    Objective:   Vitals:  Visit Vitals    /90 (BP 1 Location: Left arm, BP Patient Position: At rest)    Pulse 97    Temp 97.3 °F (36.3 °C)    Resp 18    Ht 5' 11\" (1.803 m)    Wt 112.9 kg (249 lb)    SpO2 93%    BMI 34.73 kg/m2     Intake/Output:     02/13 1901 - 02/15 0700  In: 972 [I.V.:972]  Out: 1450 [Urine:1450]  Exam:  General appearance: alert, cooperative, no distress. Sitting up in chair. Abdomen: soft, obese, non-tender. Bowel sounds normal.     Data Review (Labs):    Recent Labs      02/15/17   0547  02/14/17   0626  02/13/17   2309   WBC  6.8   --   10.6   HGB  10.0*  10.0*  12.0*   HCT  30.2*   --   36.7*   PLT  270   --   396   MCV  91.5   --   92.2   NA  142   --   139   K  3.9   --   4.0   CL  108*   --   104   CO2  23   --   21   BUN  17   --   52*   CREA  1.05   --   1.89*   CA  8.0*   --   8.9   GLU  146*   --   195*   AP   --    --   97   SGOT   --    --   16   ALT   --    --   21   TBILI   --    --   0.4   CBIL   --    --   0.1   ALB   --    --   3.7   TP   --    --   8.1     EGD 2/14/17  IMPRESSION:   1. Multiple (8 gastric ulcers) as described with no active bleeding nor stigmata of acute bleed. 2. Intact Nissen fundoplication wrap. 3. Otherwise, unremarkable upper endoscopy with no active bleeding identified at the time of upper endoscopy. Assessment:     Principal Problem:    Upper GI bleed (2/14/2017)    Active Problems:    Type II diabetes mellitus ([facility].) (2/14/2017)      ROSANGELA (acute kidney injury) ([facility] (2/14/2017)      68 yo male is seen in consultation 2/14 for evaluation of anemia and melena which began on Sunday and persisted. He presented to the ER for dizziness and was admitted by the hospitalist service. Hgb was 12 in the ER, down to 10 the morning of 2/14. He admitted to use of Aleve, four pills a day, for arthritis. He denied prior hx of gi bleed but had normal EGD in the past, performed with colonoscopy. S/p EGD 2/14 with Dr. Dionisio Martinez with findings of multiple gastric ulcers with no evidence of any active bleeding. No recurrent GI bleeding this morning per pt. Hgb stable at 10.0.       Plan:   - He was instructed to continue Protonix 40mg po qAM 30min to an hour prior to breakfast along with Carafate qAC/HS.   - Strongly advised avoidance of future NSAID use. - Follow up H.pylori ab in outpt setting and treat accordingly if positive  - Agree with discharge home today. - Will arrange outpt follow up with our office in 3-4wks at which time can discuss recommendations for f/u EGD to assess for mucosal healing of ulcers and obtain biopsies if refractory ulcers. Judge Sindhu PA-C    ATTENDING NOTE:  I agree with the above assessment and plan.     Damien Russo MD

## 2017-02-15 NOTE — DISCHARGE SUMMARY
Hospitalist Discharge Summary   Patient ID:  Elvira Macario  [id number]  89 y.o.  1940  Admit date: 2/13/2017 10:54 PM  Discharge date and time: 2/15/2017  Attending: Nikhil Sharma DO  PCP:  Boone Mckeon MD  Treatment Team: Attending Provider: Nikhil Sharma DO; Consulting Provider: Juan Daniel Martinez MD; Utilization Review: Janet Kimble RN  Principal Diagnosis Upper GI bleed   Principal Problem:    Upper GI bleed (2/14/2017)    Active Problems:    Type II diabetes mellitus ([facility] (2/14/2017)      ROSANGELA (acute kidney injury) ([facility] (2/14/2017)       * Admission Diagnoses: Upper GI bleed  * Discharge Diagnoses:    Hospital Problems as of 2/15/2017  Date Reviewed: 11/10/2016          Codes Class Noted - Resolved POA    * (Principal)Upper GI bleed ICD-10-CM: K92.2  ICD-9-CM: 578.9  2/14/2017 - Present Yes        Type II diabetes mellitus ([facility] ICD-10-CM: E11.9  ICD-9-CM: 250.00  2/14/2017 - Present Yes        ROSANGELA (acute kidney injury) [facility]) ICD-10-CM: N17.9  ICD-9-CM: 584.9  2/14/2017 - Present Yes                Hospital Course:  Mr. Lamar Lee is a 68 yo male who presented with c/o dizziness, black stools for 2 days. Reports chronic aleve BID use and ASA daily. He underwent an EGD 2/14/17, found to have 8 gastric ulcers without active bleeding. Hgb stable. Pt on protonix and carafate. Tolerating regular diet. Denies n/v/d, abd pain, CP, SOB.      Diagnostic Study/Procedure results summary copied from within [facility] EMR:      Labs: Results:       Chemistry Recent Labs      02/15/17   0547  02/13/17   2309   GLU  146*  195*   NA  142  139   K  3.9  4.0   CL  108*  104   CO2  23  21   BUN  17  52*   CREA  1.05  1.89*   CA  8.0*  8.9   AGAP  11  14   TBILI   --   0.4   ALT   --   21   AP   --   97   TP   --   8.1   ALB   --   3.7   GLOB   --   4.4*   AGRAT   --   0.8*      CBC w/Diff Recent Labs      02/15/17   0547  02/14/17   0626  02/13/17   7593   WBC  6.8   --   10.6   RBC  3.30*   --   3.98* HGB  10.0*  10.0*  12.0*   HCT  30.2*   --   36.7*   PLT  270   --   396   GRANS  60   --   55   LYMPH  26   --   32   EOS  8*   --   5      Cardiac Enzymes No results for input(s): CPK, CKND1, SUN in the last 72 hours. No lab exists for component: CKRMB, TROIP   Coagulation No results for input(s): PTP, INR, APTT in the last 72 hours. No lab exists for component: INREXT    Lipid Panel @BRIEFLAB(CHOL,CHOLPOCT,087535,538029,[id number],CHOLX,CHOLP,CHLST,CHOLV,084495,HDL,HDLPOC,HDLPOCT,223675,NHDLCT,SHP065621,HDLC,HDLP,LDL,LDLPOCT,LDLCPOC,542854,NLDLCT,DLDL,LDLC,DLDLP,620406,VLDLC,VLDL,TGL,TGLX,TRIGL,[id number],TRIGP,TGLPOCT,414814,068298,CHHD,CHHDX)@   BNP No results for input(s): BNPP in the last 72 hours. Liver Enzymes Recent Labs      02/13/17   2309   TP  8.1   ALB  3.7   AP  97   SGOT  16      Thyroid Studies No results found for: T4, T3U, TSH, TSHEXT         Discharge Exam:  Visit Vitals    /86 (BP 1 Location: Left arm, BP Patient Position: At rest)    Pulse 86    Temp 97.3 °F (36.3 °C)    Resp 18    Ht 5' 11\" (1.803 m)    Wt 112.9 kg (249 lb)    SpO2 93%    BMI 34.73 kg/m2       General: No acute distress    Lungs: CTA bilaterally. Resp even and nonlabored  Heart:  S1S2 present without murmurs rubs gallops. RRR. No edema  Abdomen: Soft, non tender, non distended. BS present  Extremities: Moves ext well  Neurologic:  No focal deficits    Disposition: home  Discharge Condition: stable  Patient Instructions:   Current Discharge Medication List      START taking these medications    Details   sucralfate (CARAFATE) 100 mg/mL suspension Take 10 mL by mouth Before breakfast, lunch, and dinner for 30 days. Qty: 900 mL, Refills: 0      pantoprazole (PROTONIX) 40 mg tablet Take 1 Tab by mouth daily. Qty: 30 Tab, Refills: 0      ondansetron hcl (ZOFRAN) 4 mg tablet Take 1 Tab by mouth every eight (8) hours as needed for Nausea.   Qty: 10 Tab, Refills: 0         CONTINUE these medications which have NOT CHANGED    Details   simvastatin (ZOCOR) 20 mg tablet Take 20 mg by mouth nightly. Triamcinolone Acetonide (NASACORT AQ) 55 mcg/actuation Aero nasal spray 1 [place] by Both Nostrils route daily. linagliptin (TRADJENTA) 5 mg tablet Take 5 mg by mouth daily. lisinopril (PRINIVIL, ZESTRIL) 10 mg tablet Take 10 mg by mouth daily. guaiFENesin SR (MUCINEX) 600 mg SR tablet Take 600 mg by mouth every evening.      multivitamin with iron (DAILY MULTI-VITAMINS/IRON) tablet Take 1 Tab by mouth daily. [employer] vitamin pack      METFORMIN PO Take 1,000 mg by mouth two (2) times a day. GLIMEPIRIDE PO Take 2 mg by mouth daily.          STOP taking these medications       ibuprofen (MOTRIN) 200 mg tablet Comments:   Reason for Stopping:         aspirin 81 mg tablet Comments:   Reason for Stopping:               Activity: Activity as tolerated  Diet: Regular Diet  Wound Care: None needed      Full Code   Surrogate decision maker: none reported    Pneumonia and flu vaccine to be administered at discharge per hospital protocol   Follow-up  ·   PCP 2 days  · GI 1 week, need to repeat EGD 8 weeks per GI recommendations  · DC on protonix, carafate and zofran      Notes, labs, VS, diagnostic testing reviewed  Case discussed with pt, care team, Dr. Lenna Canavan    Time spent to discharge patient 45 min  Signed:  Aide Reyna NP  2/15/2017  8:54 AM

## 2017-02-15 NOTE — DISCHARGE INSTRUCTIONS
DISCHARGE SUMMARY from Nurse    The following personal items are in your possession at time of discharge:    Dental Appliances: None  Visual Aid: None     Home Medications: None     Clothing: Pants, Shirt, Footwear, Undergarments, Socks  Other Valuables: Cell Phone, Eyeglasses, Other (comment) (watch)             PATIENT INSTRUCTIONS:    After general anesthesia or intravenous sedation, for 24 hours or while taking prescription Narcotics:  · Limit your activities  · Do not drive and operate hazardous machinery  · Do not make important personal or business decisions  · Do  not drink alcoholic beverages  · If you have not urinated within 8 hours after discharge, please contact your surgeon on call. Report the following to your surgeon:  · Excessive pain, swelling, redness or odor of or around the surgical area  · Temperature over 100.5  · Nausea and vomiting lasting longer than 4 hours or if unable to take medications  · Any signs of decreased circulation or nerve impairment to extremity: change in color, persistent  numbness, tingling, coldness or increase pain  · Any questions        What to do at Home:  Recommended activity: Activity as tolerated, per MD    If you experience any of the following symptoms return or worsening of black stools, fever>101, pain unrelieved with medication, nausea/vomiting, shortness of breath, dizziness/fainting, chest pain. , please follow up with your doctor. *  Please give a list of your current medications to your Primary Care Provider. *  Please update this list whenever your medications are discontinued, doses are      changed, or new medications (including over-the-counter products) are added. *  Please carry medication information at all times in case of emergency situations.           These are general instructions for a healthy lifestyle:    No smoking/ No tobacco products/ Avoid exposure to second hand smoke    Surgeon General's Warning:  Quitting smoking now greatly reduces serious risk to your health. Obesity, smoking, and sedentary lifestyle greatly increases your risk for illness    A healthy diet, regular physical exercise & weight monitoring are important for maintaining a healthy lifestyle    You may be retaining fluid if you have a history of heart failure or if you experience any of the following symptoms:  Weight gain of 3 pounds or more overnight or 5 pounds in a week, increased swelling in our hands or feet or shortness of breath while lying flat in bed. Please call your doctor as soon as you notice any of these symptoms; do not wait until your next office visit. Recognize signs and symptoms of STROKE:    F-face looks uneven    A-arms unable to move or move unevenly    S-speech slurred or non-existent    T-time-call 911 as soon as signs and symptoms begin-DO NOT go       Back to bed or wait to see if you get better-TIME IS BRAIN. Warning Signs of HEART ATTACK     Call 911 if you have these symptoms:   Chest discomfort. Most heart attacks involve discomfort in the center of the chest that lasts more than a few minutes, or that goes away and comes back. It can feel like uncomfortable pressure, squeezing, fullness, or pain.  Discomfort in other areas of the upper body. Symptoms can include pain or discomfort in one or both arms, the back, neck, jaw, or stomach.  Shortness of breath with or without chest discomfort.  Other signs may include breaking out in a cold sweat, nausea, or lightheadedness. Don't wait more than five minutes to call 911 - MINUTES MATTER! Fast action can save your life. Calling 911 is almost always the fastest way to get lifesaving treatment. Emergency Medical Services staff can begin treatment when they arrive -- up to an hour sooner than if someone gets to the hospital by car. The discharge information has been reviewed with the patient. The patient verbalized understanding.     Discharge medications reviewed with the patient and appropriate educational materials and side effects teaching were provided. Upper Gastrointestinal Bleeding: Care Instructions  Your Care Instructions    The digestive or gastrointestinal tract goes from the mouth to the anus. It is often called the GI tract. Bleeding in the upper GI tract can happen anywhere from the esophagus to the first part of the small intestine. Sometimes it's caused by an ulcer in your stomach. Or it may be caused by blood vessels in your esophagus. Your esophagus is the tube that carries food from your throat to your stomach. Light bleeding may not cause any symptoms at first. But if you continue to bleed for a while, you may feel very weak or tired. Sudden, heavy bleeding means you need to see a doctor right away. This kind of bleeding can be very dangerous. But it can usually be cured or controlled. The doctor may do some tests to find the cause of your bleeding. Follow-up care is a key part of your treatment and safety. Be sure to make and go to all appointments, and call your doctor if you are having problems. It's also a good idea to know your test results and keep a list of the medicines you take. How can you care for yourself at home? · Be safe with medicines. Take your medicines exactly as prescribed. Call your doctor if you think you are having a problem with your medicine. You will get more details on the specific medicines your doctor prescribes. · Do not take aspirin or other anti-inflammatory medicines, such as naproxen (Aleve) or ibuprofen (Advil, Motrin), without talking to your doctor first. Ask your doctor if it is okay to use acetaminophen (Tylenol). · Do not drink alcohol. · The bleeding may make you lose iron. So it's important to eat foods that have a lot of iron. These include red meat, shellfish, poultry, and eggs. They also include beans, raisins, whole-grain breads, and leafy green vegetables.  If you want help planning meals, you can meet with a dietitian. When should you call for help? Call 911 anytime you think you may need emergency care. For example, call if:  · You have sudden, severe belly pain. · You vomit blood or what looks like coffee grounds. · You passed out (lost consciousness). · Your stools are maroon or very bloody. Call your doctor now or seek immediate medical care if:  · You are dizzy or lightheaded, or you feel like you may faint. · Your stools are black and look like tar. · You have belly pain. · You vomit or have nausea. · You have trouble swallowing, or it hurts when you swallow. Watch closely for changes in your health, and be sure to contact your doctor if you do not get better as expected. Where can you learn more? Go to http://shane-henrietta.info/. Enter H195 in the search box to learn more about \"Upper Gastrointestinal Bleeding: Care Instructions. \"  Current as of: May 27, 2016  Content Version: 11.1  © 0159-8133 [employer]. Care instructions adapted under license by [employer] (which disclaims liability or warranty for this information). If you have questions about a medical condition or this instruction, always ask your healthcare professional. [address] any warranty or liability for your use of this information.

## 2017-02-15 NOTE — PROGRESS NOTES
PM assessment complete. Alert, oriented x 4. Respirations even and unlabored, no distress noted. IVF and protonix drip infusing without difficulty. Abdomen soft, bowel sounds active in all 4 quadrants. Denies needs or pain. Family at bedside. Aware to call should needs arise.

## 2017-08-02 ENCOUNTER — HOSPITAL ENCOUNTER (OUTPATIENT)
Dept: LAB | Age: 77
Discharge: HOME | End: 2017-08-02

## 2017-08-02 PROCEDURE — 88305 TISSUE EXAM BY PATHOLOGIST: CPT | Performed by: INTERNAL MEDICINE

## 2017-10-06 ENCOUNTER — APPOINTMENT (RX ONLY)
Dept: URBAN - METROPOLITAN AREA CLINIC 349 | Facility: CLINIC | Age: 77
Setting detail: DERMATOLOGY
End: 2017-10-06

## 2017-10-06 DIAGNOSIS — L57.0 ACTINIC KERATOSIS: ICD-10-CM

## 2017-10-06 DIAGNOSIS — L81.4 OTHER MELANIN HYPERPIGMENTATION: ICD-10-CM

## 2017-10-06 PROCEDURE — ? COUNSELING

## 2017-10-06 PROCEDURE — 17003 DESTRUCT PREMALG LES 2-14: CPT

## 2017-10-06 PROCEDURE — 99202 OFFICE O/P NEW SF 15 MIN: CPT | Mod: 25

## 2017-10-06 PROCEDURE — 17000 DESTRUCT PREMALG LESION: CPT

## 2017-10-06 PROCEDURE — ? LIQUID NITROGEN

## 2017-10-06 ASSESSMENT — LOCATION DETAILED DESCRIPTION DERM
LOCATION DETAILED: LEFT FOREHEAD
LOCATION DETAILED: LEFT INFERIOR FOREHEAD
LOCATION DETAILED: RIGHT CENTRAL MALAR CHEEK
LOCATION DETAILED: LEFT CENTRAL MALAR CHEEK
LOCATION DETAILED: RIGHT LATERAL FOREHEAD

## 2017-10-06 ASSESSMENT — LOCATION SIMPLE DESCRIPTION DERM
LOCATION SIMPLE: LEFT CHEEK
LOCATION SIMPLE: RIGHT FOREHEAD
LOCATION SIMPLE: LEFT FOREHEAD
LOCATION SIMPLE: RIGHT CHEEK

## 2017-10-06 ASSESSMENT — LOCATION ZONE DERM: LOCATION ZONE: FACE

## 2017-10-06 ASSESSMENT — PAIN INTENSITY VAS: HOW INTENSE IS YOUR PAIN 0 BEING NO PAIN, 10 BEING THE MOST SEVERE PAIN POSSIBLE?: NO PAIN

## 2017-10-06 NOTE — PROCEDURE: LIQUID NITROGEN
Consent: The patient's consent was obtained including but not limited to risks of crusting, scabbing, blistering, scarring, darker or lighter pigmentary change, recurrence, incomplete removal and infection.
Render Post-Care Instructions In Note?: no
Detail Level: Detailed
Number Of Freeze-Thaw Cycles: 2 freeze-thaw cycles
Post-Care Instructions: I reviewed with the patient in detail post-care instructions. Patient is to wear sunprotection, and avoid picking at any of the treated lesions. Pt may apply Vaseline to crusted or scabbing areas.
Duration Of Freeze Thaw-Cycle (Seconds): 3

## 2018-01-05 ENCOUNTER — APPOINTMENT (RX ONLY)
Dept: URBAN - METROPOLITAN AREA CLINIC 349 | Facility: CLINIC | Age: 78
Setting detail: DERMATOLOGY
End: 2018-01-05

## 2018-01-05 DIAGNOSIS — L57.8 OTHER SKIN CHANGES DUE TO CHRONIC EXPOSURE TO NONIONIZING RADIATION: ICD-10-CM

## 2018-01-05 DIAGNOSIS — L57.0 ACTINIC KERATOSIS: ICD-10-CM | Status: RESOLVED

## 2018-01-05 PROBLEM — I10 ESSENTIAL (PRIMARY) HYPERTENSION: Status: ACTIVE | Noted: 2018-01-05

## 2018-01-05 PROBLEM — E13.9 OTHER SPECIFIED DIABETES MELLITUS WITHOUT COMPLICATIONS: Status: ACTIVE | Noted: 2018-01-05

## 2018-01-05 PROBLEM — M12.9 ARTHROPATHY, UNSPECIFIED: Status: ACTIVE | Noted: 2018-01-05

## 2018-01-05 PROCEDURE — 17000 DESTRUCT PREMALG LESION: CPT

## 2018-01-05 PROCEDURE — ? LIQUID NITROGEN

## 2018-01-05 PROCEDURE — ? COUNSELING

## 2018-01-05 PROCEDURE — 99213 OFFICE O/P EST LOW 20 MIN: CPT | Mod: 25

## 2018-01-05 ASSESSMENT — LOCATION SIMPLE DESCRIPTION DERM
LOCATION SIMPLE: RIGHT HAND
LOCATION SIMPLE: LEFT FOREHEAD
LOCATION SIMPLE: LEFT EYEBROW

## 2018-01-05 ASSESSMENT — LOCATION DETAILED DESCRIPTION DERM
LOCATION DETAILED: LEFT LATERAL EYEBROW
LOCATION DETAILED: LEFT INFERIOR FOREHEAD
LOCATION DETAILED: RIGHT RADIAL DORSAL HAND

## 2018-01-05 ASSESSMENT — LOCATION ZONE DERM
LOCATION ZONE: FACE
LOCATION ZONE: HAND

## 2018-01-05 NOTE — PROCEDURE: LIQUID NITROGEN
Number Of Freeze-Thaw Cycles: 2 freeze-thaw cycles
Detail Level: Detailed
Duration Of Freeze Thaw-Cycle (Seconds): 3
Consent: The patient's consent was obtained including but not limited to risks of crusting, scabbing, blistering, scarring, darker or lighter pigmentary change, recurrence, incomplete removal and infection.
Post-Care Instructions: I reviewed with the patient in detail post-care instructions. Patient is to wear sunprotection, and avoid picking at any of the treated lesions. Pt may apply Vaseline to crusted or scabbing areas.
Render Post-Care Instructions In Note?: no

## 2019-02-24 ENCOUNTER — APPOINTMENT (OUTPATIENT)
Dept: GENERAL RADIOLOGY | Age: 79
End: 2019-02-24
Attending: EMERGENCY MEDICINE
Payer: MEDICARE

## 2019-02-24 ENCOUNTER — APPOINTMENT (OUTPATIENT)
Dept: CT IMAGING | Age: 79
End: 2019-02-24
Attending: EMERGENCY MEDICINE
Payer: MEDICARE

## 2019-02-24 ENCOUNTER — HOSPITAL ENCOUNTER (OUTPATIENT)
Age: 79
Setting detail: OBSERVATION
Discharge: HOME HEALTH | End: 2019-02-25
Attending: EMERGENCY MEDICINE | Admitting: HOSPITALIST
Payer: MEDICARE

## 2019-02-24 DIAGNOSIS — R47.01 EXPRESSIVE APHASIA: Primary | ICD-10-CM
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
## 2019-02-24 PROBLEM — R41.82 ALTERED MENTAL STATUS: Status: ACTIVE | Noted: 2019-02-24

## 2019-02-24 PROBLEM — I10 HYPERTENSION: Status: ACTIVE | Noted: 2019-02-24

## 2019-02-24 PROBLEM — K21.9 GERD (GASTROESOPHAGEAL REFLUX DISEASE): Status: ACTIVE | Noted: 2019-02-24

## 2019-02-24 PROBLEM — G45.9 TIA (TRANSIENT ISCHEMIC ATTACK): Status: ACTIVE | Noted: 2019-02-24

## 2019-02-24 LAB
ALBUMIN SERPL-MCNC: 3.7 G/DL (ref 3.2–4.6)
ALBUMIN/GLOB SERPL: 0.9 {RATIO}
ALP SERPL-CCNC: 67 U/L (ref 50–136)
ALT SERPL-CCNC: 20 U/L (ref 12–65)
ANION GAP SERPL CALC-SCNC: 11 MMOL/L
AST SERPL-CCNC: 24 U/L (ref 15–37)
BASOPHILS # BLD: 0 K/UL (ref 0–0.2)
BASOPHILS NFR BLD: 0 % (ref 0–2)
BILIRUB SERPL-MCNC: 0.5 MG/DL (ref 0.2–1.1)
BUN SERPL-MCNC: 14 MG/DL (ref 8–23)
CALCIUM SERPL-MCNC: 8.8 MG/DL (ref 8.3–10.4)
CHLORIDE SERPL-SCNC: 101 MMOL/L (ref 98–107)
CO2 SERPL-SCNC: 23 MMOL/L (ref 21–32)
CREAT SERPL-MCNC: 1.25 MG/DL (ref 0.8–1.5)
DIFFERENTIAL METHOD BLD: ABNORMAL
EOSINOPHIL # BLD: 0 K/UL (ref 0–0.8)
EOSINOPHIL NFR BLD: 0 % (ref 0.5–7.8)
ERYTHROCYTE [DISTWIDTH] IN BLOOD BY AUTOMATED COUNT: 14.6 % (ref 11.9–14.6)
GLOBULIN SER CALC-MCNC: 3.9 G/DL (ref 2.3–3.5)
GLUCOSE SERPL-MCNC: 207 MG/DL (ref 65–100)
HCT VFR BLD AUTO: 40.5 % (ref 41.1–50.3)
HGB BLD-MCNC: 13.3 G/DL (ref 13.6–17.2)
IMM GRANULOCYTES # BLD AUTO: 0 K/UL (ref 0–0.5)
IMM GRANULOCYTES NFR BLD AUTO: 0 % (ref 0–5)
LYMPHOCYTES # BLD: 0.4 K/UL (ref 0.5–4.6)
LYMPHOCYTES NFR BLD: 7 % (ref 13–44)
MCH RBC QN AUTO: 29.6 PG (ref 26.1–32.9)
MCHC RBC AUTO-ENTMCNC: 32.8 G/DL (ref 31.4–35)
MCV RBC AUTO: 90 FL (ref 79.6–97.8)
MONOCYTES # BLD: 0.6 K/UL (ref 0.1–1.3)
MONOCYTES NFR BLD: 11 % (ref 4–12)
NEUTS SEG # BLD: 4.5 K/UL (ref 1.7–8.2)
NEUTS SEG NFR BLD: 81 % (ref 43–78)
NRBC # BLD: 0 K/UL (ref 0–0.2)
PLATELET # BLD AUTO: 225 K/UL (ref 150–450)
PMV BLD AUTO: 9.9 FL (ref 9.4–12.3)
POTASSIUM SERPL-SCNC: 4.3 MMOL/L (ref 3.5–5.1)
PROT SERPL-MCNC: 7.6 G/DL
RBC # BLD AUTO: 4.5 M/UL (ref 4.23–5.6)
SODIUM SERPL-SCNC: 135 MMOL/L (ref 136–145)
WBC # BLD AUTO: 5.5 K/UL (ref 4.3–11.1)

## 2019-02-24 PROCEDURE — 93005 ELECTROCARDIOGRAM TRACING: CPT | Performed by: EMERGENCY MEDICINE

## 2019-02-24 PROCEDURE — 99285 EMERGENCY DEPT VISIT HI MDM: CPT | Performed by: EMERGENCY MEDICINE

## 2019-02-24 PROCEDURE — 85025 COMPLETE CBC W/AUTO DIFF WBC: CPT

## 2019-02-24 PROCEDURE — 99218 HC RM OBSERVATION: CPT

## 2019-02-24 PROCEDURE — 80053 COMPREHEN METABOLIC PANEL: CPT

## 2019-02-24 PROCEDURE — 83036 HEMOGLOBIN GLYCOSYLATED A1C: CPT

## 2019-02-24 PROCEDURE — 71045 X-RAY EXAM CHEST 1 VIEW: CPT

## 2019-02-24 PROCEDURE — 70450 CT HEAD/BRAIN W/O DYE: CPT

## 2019-02-25 ENCOUNTER — APPOINTMENT (OUTPATIENT)
Dept: MRI IMAGING | Age: 79
End: 2019-02-25
Attending: HOSPITALIST
Payer: MEDICARE

## 2019-02-25 ENCOUNTER — APPOINTMENT (OUTPATIENT)
Dept: CT IMAGING | Age: 79
End: 2019-02-25
Attending: FAMILY MEDICINE
Payer: MEDICARE

## 2019-02-25 ENCOUNTER — HOME HEALTH ADMISSION (OUTPATIENT)
Dept: HOME HEALTH SERVICES | Facility: HOME HEALTH | Age: 79
End: 2019-02-25
Payer: MEDICARE

## 2019-02-25 VITALS
DIASTOLIC BLOOD PRESSURE: 81 MMHG | HEIGHT: 70 IN | SYSTOLIC BLOOD PRESSURE: 144 MMHG | WEIGHT: 239 LBS | BODY MASS INDEX: 34.22 KG/M2 | OXYGEN SATURATION: 92 % | RESPIRATION RATE: 18 BRPM | TEMPERATURE: 97.7 F | HEART RATE: 81 BPM

## 2019-02-25 PROBLEM — I72.0 ANEURYSM OF RIGHT CAROTID ARTERY (HCC): Status: ACTIVE | Noted: 2019-02-25

## 2019-02-25 PROBLEM — Q21.12 PFO (PATENT FORAMEN OVALE): Status: ACTIVE | Noted: 2019-02-25

## 2019-02-25 LAB
ATRIAL RATE: 111 BPM
CALCULATED P AXIS, ECG09: 41 DEGREES
CALCULATED R AXIS, ECG10: -19 DEGREES
CALCULATED T AXIS, ECG11: 58 DEGREES
CHOLEST SERPL-MCNC: 96 MG/DL
DIAGNOSIS, 93000: NORMAL
EST. AVERAGE GLUCOSE BLD GHB EST-MCNC: 151 MG/DL
GLUCOSE BLD STRIP.AUTO-MCNC: 124 MG/DL (ref 65–100)
GLUCOSE BLD STRIP.AUTO-MCNC: 131 MG/DL (ref 65–100)
GLUCOSE BLD STRIP.AUTO-MCNC: 132 MG/DL (ref 65–100)
HBA1C MFR BLD: 6.9 %
HDLC SERPL-MCNC: 45 MG/DL (ref 40–60)
HDLC SERPL: 2.1 {RATIO}
LDLC SERPL CALC-MCNC: 26 MG/DL
LIPID PROFILE,FLP: ABNORMAL
P-R INTERVAL, ECG05: 166 MS
Q-T INTERVAL, ECG07: 314 MS
QRS DURATION, ECG06: 94 MS
QTC CALCULATION (BEZET), ECG08: 427 MS
TRIGL SERPL-MCNC: 125 MG/DL (ref 35–150)
VENTRICULAR RATE, ECG03: 111 BPM
VLDLC SERPL CALC-MCNC: 25 MG/DL (ref 6–23)

## 2019-02-25 PROCEDURE — 77030020255 HC SOL INJ LR 1000ML BG

## 2019-02-25 PROCEDURE — 74011250636 HC RX REV CODE- 250/636: Performed by: HOSPITALIST

## 2019-02-25 PROCEDURE — 92610 EVALUATE SWALLOWING FUNCTION: CPT

## 2019-02-25 PROCEDURE — 96360 HYDRATION IV INFUSION INIT: CPT

## 2019-02-25 PROCEDURE — 97161 PT EVAL LOW COMPLEX 20 MIN: CPT

## 2019-02-25 PROCEDURE — 80061 LIPID PANEL: CPT

## 2019-02-25 PROCEDURE — 70551 MRI BRAIN STEM W/O DYE: CPT

## 2019-02-25 PROCEDURE — 70496 CT ANGIOGRAPHY HEAD: CPT

## 2019-02-25 PROCEDURE — 93306 TTE W/DOPPLER COMPLETE: CPT

## 2019-02-25 PROCEDURE — 97165 OT EVAL LOW COMPLEX 30 MIN: CPT

## 2019-02-25 PROCEDURE — 74011250637 HC RX REV CODE- 250/637: Performed by: HOSPITALIST

## 2019-02-25 PROCEDURE — 77030020263 HC SOL INJ SOD CL0.9% LFCR 1000ML

## 2019-02-25 PROCEDURE — 96361 HYDRATE IV INFUSION ADD-ON: CPT

## 2019-02-25 PROCEDURE — 82962 GLUCOSE BLOOD TEST: CPT

## 2019-02-25 PROCEDURE — 96372 THER/PROPH/DIAG INJ SC/IM: CPT

## 2019-02-25 PROCEDURE — 74011636320 HC RX REV CODE- 636/320: Performed by: HOSPITALIST

## 2019-02-25 PROCEDURE — 97110 THERAPEUTIC EXERCISES: CPT

## 2019-02-25 PROCEDURE — 99218 HC RM OBSERVATION: CPT

## 2019-02-25 RX ORDER — ASPIRIN 325 MG
325 TABLET ORAL DAILY
Status: DISCONTINUED | OUTPATIENT
Start: 2019-02-25 | End: 2019-02-25 | Stop reason: HOSPADM

## 2019-02-25 RX ORDER — SODIUM CHLORIDE 0.9 % (FLUSH) 0.9 %
10 SYRINGE (ML) INJECTION
Status: COMPLETED | OUTPATIENT
Start: 2019-02-25 | End: 2019-02-25

## 2019-02-25 RX ORDER — ONDANSETRON 2 MG/ML
4 INJECTION INTRAMUSCULAR; INTRAVENOUS
Status: DISCONTINUED | OUTPATIENT
Start: 2019-02-25 | End: 2019-02-25 | Stop reason: HOSPADM

## 2019-02-25 RX ORDER — CLOPIDOGREL BISULFATE 75 MG/1
75 TABLET ORAL DAILY
Qty: 1 TAB | Refills: 0 | Status: SHIPPED | OUTPATIENT
Start: 2019-02-26

## 2019-02-25 RX ORDER — CLOPIDOGREL BISULFATE 75 MG/1
75 TABLET ORAL DAILY
Status: DISCONTINUED | OUTPATIENT
Start: 2019-02-25 | End: 2019-02-25 | Stop reason: HOSPADM

## 2019-02-25 RX ORDER — TAMSULOSIN HYDROCHLORIDE 0.4 MG/1
0.4 CAPSULE ORAL
Status: DISCONTINUED | OUTPATIENT
Start: 2019-02-25 | End: 2019-02-25 | Stop reason: HOSPADM

## 2019-02-25 RX ORDER — SODIUM CHLORIDE 9 MG/ML
100 INJECTION, SOLUTION INTRAVENOUS CONTINUOUS
Status: DISCONTINUED | OUTPATIENT
Start: 2019-02-25 | End: 2019-02-25 | Stop reason: HOSPADM

## 2019-02-25 RX ORDER — BUDESONIDE 0.5 MG/2ML
2 INHALANT ORAL 2 TIMES DAILY
Status: DISCONTINUED | OUTPATIENT
Start: 2019-02-25 | End: 2019-02-25 | Stop reason: HOSPADM

## 2019-02-25 RX ORDER — ASPIRIN 325 MG
325 TABLET ORAL ONCE
Status: DISCONTINUED | OUTPATIENT
Start: 2019-02-25 | End: 2019-02-25 | Stop reason: SDUPTHER

## 2019-02-25 RX ORDER — POLYETHYLENE GLYCOL 3350 17 G/17G
17 POWDER, FOR SOLUTION ORAL DAILY
Status: DISCONTINUED | OUTPATIENT
Start: 2019-02-25 | End: 2019-02-25 | Stop reason: HOSPADM

## 2019-02-25 RX ORDER — DEXTROSE 50 % IN WATER (D50W) INTRAVENOUS SYRINGE
25-50 AS NEEDED
Status: DISCONTINUED | OUTPATIENT
Start: 2019-02-25 | End: 2019-02-25 | Stop reason: HOSPADM

## 2019-02-25 RX ORDER — ASPIRIN 325 MG
325 TABLET ORAL ONCE
Status: COMPLETED | OUTPATIENT
Start: 2019-02-25 | End: 2019-02-25

## 2019-02-25 RX ORDER — INSULIN LISPRO 100 [IU]/ML
INJECTION, SOLUTION INTRAVENOUS; SUBCUTANEOUS
Status: DISCONTINUED | OUTPATIENT
Start: 2019-02-25 | End: 2019-02-25 | Stop reason: HOSPADM

## 2019-02-25 RX ORDER — DEXTROSE 40 %
15 GEL (GRAM) ORAL AS NEEDED
Status: DISCONTINUED | OUTPATIENT
Start: 2019-02-25 | End: 2019-02-25 | Stop reason: HOSPADM

## 2019-02-25 RX ORDER — DUTASTERIDE 0.5 MG/1
0.5 CAPSULE, LIQUID FILLED ORAL DAILY
Status: DISCONTINUED | OUTPATIENT
Start: 2019-02-25 | End: 2019-02-25 | Stop reason: HOSPADM

## 2019-02-25 RX ORDER — LISINOPRIL 20 MG/1
20 TABLET ORAL DAILY
Status: DISCONTINUED | OUTPATIENT
Start: 2019-02-25 | End: 2019-02-25 | Stop reason: HOSPADM

## 2019-02-25 RX ORDER — SODIUM CHLORIDE 0.9 % (FLUSH) 0.9 %
5-40 SYRINGE (ML) INJECTION EVERY 8 HOURS
Status: DISCONTINUED | OUTPATIENT
Start: 2019-02-25 | End: 2019-02-25 | Stop reason: HOSPADM

## 2019-02-25 RX ORDER — SODIUM CHLORIDE 0.9 % (FLUSH) 0.9 %
5-40 SYRINGE (ML) INJECTION AS NEEDED
Status: DISCONTINUED | OUTPATIENT
Start: 2019-02-25 | End: 2019-02-25 | Stop reason: HOSPADM

## 2019-02-25 RX ORDER — ENOXAPARIN SODIUM 100 MG/ML
40 INJECTION SUBCUTANEOUS EVERY 24 HOURS
Status: DISCONTINUED | OUTPATIENT
Start: 2019-02-25 | End: 2019-02-25 | Stop reason: HOSPADM

## 2019-02-25 RX ORDER — SIMVASTATIN 20 MG/1
20 TABLET, FILM COATED ORAL
Status: DISCONTINUED | OUTPATIENT
Start: 2019-02-25 | End: 2019-02-25 | Stop reason: HOSPADM

## 2019-02-25 RX ORDER — ASPIRIN 325 MG
TABLET ORAL
Status: ACTIVE
Start: 2019-02-25 | End: 2019-02-25

## 2019-02-25 RX ADMIN — ASPIRIN 325 MG ORAL TABLET 325 MG: 325 PILL ORAL at 09:00

## 2019-02-25 RX ADMIN — Medication 10 ML: at 08:28

## 2019-02-25 RX ADMIN — LISINOPRIL 20 MG: 20 TABLET ORAL at 09:01

## 2019-02-25 RX ADMIN — IOPAMIDOL 80 ML: 755 INJECTION, SOLUTION INTRAVENOUS at 08:28

## 2019-02-25 RX ADMIN — CLOPIDOGREL BISULFATE 75 MG: 75 TABLET ORAL at 09:01

## 2019-02-25 RX ADMIN — SODIUM CHLORIDE 100 ML/HR: 900 INJECTION, SOLUTION INTRAVENOUS at 01:23

## 2019-02-25 RX ADMIN — ENOXAPARIN SODIUM 40 MG: 40 INJECTION SUBCUTANEOUS at 09:10

## 2019-02-25 RX ADMIN — DUTASTERIDE 0.5 MG: 0.5 CAPSULE, LIQUID FILLED ORAL at 09:01

## 2019-02-25 RX ADMIN — SIMVASTATIN 20 MG: 20 TABLET, FILM COATED ORAL at 01:21

## 2019-02-25 RX ADMIN — ASPIRIN 325 MG ORAL TABLET 325 MG: 325 PILL ORAL at 01:21

## 2019-02-25 RX ADMIN — TAMSULOSIN HYDROCHLORIDE 0.4 MG: 0.4 CAPSULE ORAL at 01:21

## 2019-02-25 NOTE — ED TRIAGE NOTES
Pt in via EMS from home after falling off the toilet at [address] with weakness and fever. Pt wife reports finding him on his left side on the floor. 18 in left forearm placed by EMS. Wife reports giving pt Tylenol for his fever. Pt confused about reason for being here during triage, but oriented to person, place, and time.

## 2019-02-25 NOTE — PROGRESS NOTES
Admission assessment complete. Pt is in bed resting. Alert to person, place and time, disoriented to situation. Purposeful Movement of all extremities. Respiration are even and unlabored. Tele monitor set up and working box #0240. Pt told to not get out of bed without calling. Bed in low and locked position with call light within reach. No other needs at this time.

## 2019-02-25 NOTE — PROGRESS NOTES
I have reviewed discharge instructions with the patient. The patient verbalized understanding. Home health arranged to make home visit. Rx sent to [facility]. Will call to make appt with Dr Jamison Pac [phone] in 2 wks. PCP in one wk. No questions at this time.

## 2019-02-25 NOTE — PROGRESS NOTES
Problem: Mobility Impaired (Adult and Pediatric) Goal: *Acute Goals and Plan of Care (Insert Text) DISCHARGE GOALS : 
(1.)Mr. Lucia Flores will transfer from bed to chair and chair to bed with MODIFIED INDEPENDENCE using the least restrictive device . (2.)Mr. Lucia Flores will ambulate with MODIFIED INDEPENDENCE for 300 feet with the least restrictive device. 3) pt able to climb 3 steps with rail & cane MODIFIED INDEPENDENT. PHYSICAL THERAPY: Initial Assessment and AM 2/25/2019OBSERVATION: PT Visit Days : 1 Payor: [employer]. / Plan:  [employer], [employer]. / Product Type: Commerical /   
  
NAME/AGE/GENDER: New Gautam is a 77 y.o. male PRIMARY DIAGNOSIS: TIA (transient ischemic attack) [G45.9] TIA (transient ischemic attack) TIA (transient ischemic attack) ICD-10: Treatment Diagnosis:  
 · Generalized Muscle Weakness (M62.81) · Other abnormalities of gait and mobility (R26.89) Precaution/Allergies: 
Patient has no known allergies. ASSESSMENT:  
Mr. Lucia Flores presents with mildly unsteady gait & transfers while using a cane. This pt will benefit from follow up therapy to help restore a modified independent functional gait & HHPT to ensure pt is progressing back to his baseline. This section established at most recent assessment PROBLEM LIST (Impairments causing functional limitations): 1. Decreased Strength 2. Decreased ADL/Functional Activities 3. Decreased Transfer Abilities 4. Decreased Ambulation Ability/Technique 5. Decreased Balance INTERVENTIONS PLANNED: (Benefits and precautions of physical therapy have been discussed with the patient.) 1. Balance Exercise 2. Bed Mobility 3. Gait Training 4. Therapeutic Activites 5. Therapeutic Exercise/Strengthening 6. Transfer Training TREATMENT PLAN: Frequency/Duration: daily for duration of hospital stay Rehabilitation Potential For Stated Goals: Good RECOMMENDED REHABILITATION/EQUIPMENT: (at time of discharge pending progress): Due to the probability of continued deficits (see above) this patient will likely need continued skilled physical therapy after discharge. Equipment:  
? to be determined HISTORY:  
History of Present Injury/Illness (Reason for Referral): 
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 She reported that he had new expressive aphasia and stuttering speech and is disoriented. Has delayed responses to questions. He is followed by [facility] and takes aspirin and plavix. His only complaint is of a headache tonight. No chest pain or palpitations. Initial non-contrast head CT is negative. Hospitalist service is consulted for admission and further workup. Past Medical History/Comorbidities:  
Mr. Lamar Lee  has a past medical history of Calculus of kidney, Closed left arm fracture (4/30/13), Diabetes mellitus type II, GERD (gastroesophageal reflux disease), hx of Pleurisy (15 yrs ago), hx of Vitamin K deficiency, Hypertension, Hypertrophy of prostate with urinary obstruction and other lower urinary tract symptoms (LUTS), Personal history of kidney stones, Pure hypercholesterolemia, TIA (transient ischemic attack) (2/24/2019), Type II diabetes mellitus ([facility] [address] (2/14/2017), Upper GI bleed (2/14/2017), and Urgency of urination. He also has no past medical history of Dementia, Infectious disease, or Sleep disorder. Mr. Lamar Lee  has a past surgical history that includes hx other surgical; hx cholecystectomy; and hx hernia repair. Social History/Living Environment:  
Home Environment: Private residence # Steps to Enter: 3 Rails to Enter: Yes Hand Rails : Left One/Two Story Residence: Two story, live on 1st floor Living Alone: No 
 Support Systems: Spouse/Significant Other/Partner Patient Expects to be Discharged to[de-identified] Private residence Current DME Used/Available at Home: Cane, straight Tub or Shower Type: Shower Prior Level of Function/Work/Activity: Pt was functional in the home without an assistive device & out of the home using a cane prior to this admission. Personal Factors: Other factors that influence how disability is experienced by the patient:  Current & PMH Number of Personal Factors/Comorbidities that affect the Plan of Care: 3+: HIGH COMPLEXITY EXAMINATION:  
Most Recent Physical Functioning:  
Gross Assessment: 
AROM: Generally decreased, functional(all limbs & core) Strength: Generally decreased, functional(all limbs & core) Coordination: Generally decreased, functional(all limbs & core) Balance: 
Sitting: Intact; Without support Standing: Impaired; With support(cane) Bed Mobility: 
Supine to Sit: Independent Sit to Supine: (NT) Scooting: Independent Transfers: 
Sit to Stand: Contact guard assistance Stand to Sit: Contact guard assistance Bed to Chair: Contact guard assistance(with cane) Gait: 
  
Speed/Marianna: Delayed Step Length: (irregular) Gait Abnormalities: Decreased step clearance(mild sway present) Distance (ft): 100 Feet (ft) Assistive Device: Cane, straight Ambulation - Level of Assistance: Contact guard assistance Functional Mobility:  
      Gait/Ambulation:  cga Transfers:  cga Bed Mobility:  Mod I Body Structures Involved: 1. Nerves 2. Muscles Body Functions Affected: 1. Neuromusculoskeletal 
2. Movement Related Activities and Participation Affected: 1. General Tasks and Demands 2. Mobility Number of elements that affect the Plan of Care: 4+: HIGH COMPLEXITY CLINICAL PRESENTATION:  
Presentation: Stable and uncomplicated: LOW COMPLEXITY CLINICAL DECISION MAKIN [facility] [address] [id number] AM-PAC 6 Clicks Basic Mobility Inpatient Short Form How much difficulty does the patient currently have. .. Unable A Lot A Little None 1. Turning over in bed (including adjusting bedclothes, sheets and blankets)? [] 1   [] 2   [x] 3   [] 4  
2. Sitting down on and standing up from a chair with arms ( e.g., wheelchair, bedside commode, etc.)   [] 1   [] 2   [x] 3   [] 4  
3. Moving from lying on back to sitting on the side of the bed? [] 1   [] 2   [x] 3   [] 4 How much help from another person does the patient currently need. .. Total A Lot A Little None 4. Moving to and from a bed to a chair (including a wheelchair)? [] 1   [] 2   [x] 3   [] 4  
5. Need to walk in hospital room? [] 1   [] 2   [x] 3   [] 4  
6. Climbing 3-5 steps with a railing? [x] 1   [] 2   [] 3   [] 4  
© 2007, [employer] of [facility] [employer], under license to [employer]. All rights reserved Score:  Initial: 16 Most Recent: X (Date: -- ) Interpretation of Tool:  Represents activities that are increasingly more difficult (i.e. Bed mobility, Transfers, Gait). Medical Necessity:    
· Patient is expected to demonstrate progress in strength, balance, coordination and functional technique to decrease assistance required with transfers & gait. Reason for Services/Other Comments: 
· Patient continues to require skilled intervention due to pt unsafe with functional mobility. Use of outcome tool(s) and clinical judgement create a POC that gives a: Clear prediction of patient's progress: LOW COMPLEXITY  
  
 
 
 
TREATMENT:  
(In addition to Assessment/Re-Assessment sessions the following treatments were rendered) Pre-treatment Symptoms/Complaints:  weakness Pain: Initial: visual scale Pain Intensity 1: 0  Post Session:  0/10 Assessment/ 11 min Therapeutic Exercise: (12 Minutes (extra time to work through activity noted):  Exercises : standing balance dynamic without UE support ( diagonal wt-shift R<>L, overhead reach, alternating reaching in front, standing trunk rotation to improve mobility, balance, coordination and dynamic movement of arm - bilateral, leg - bilateral and core to improve functional core stability in [address]. Braces/Orthotics/Lines/Etc:  
· IV Treatment/Session Assessment:   
· Response to Treatment:  Tolerated well · Interdisciplinary Collaboration:  
o Registered Nurse 
o  · After treatment position/precautions:  
o Up in chair 
o Bed alarm/tab alert on 
o Bed/Chair-wheels locked 
o Call light within reach 
o RN notified · Compliance with Program/Exercises: Will assess as treatment progresses · Recommendations/Intent for next treatment session: \"Next visit will focus on advancements to more challenging activities and reduction in assistance provided\". Total Treatment Duration: PT Patient Time In/Time Out Time In: 8208 Time Out: 1114 Viraj Alvarado PT

## 2019-02-25 NOTE — PROGRESS NOTES
[facility] Face to Face Encounter Patients Name: Elvira Macario    YOB: 1940 Ordering Physician: Christine Abarca Primary Diagnosis: TIA (transient ischemic attack) [G45.9] Date of Face to Face:   2/25/2019 Face to Face Encounter findings are related to primary reason for home care:   yes. 1. I certify that the patient needs intermittent care as follows: physical therapy: gait/stair training 2. I certify that this patient is homebound, that is: 1) patient requires the use of a cane device, special transportation, or assistance of another to leave the home; or 2) patient's condition makes leaving the home medically contraindicated; and 3) patient has a normal inability to leave the home and leaving the home requires considerable and taxing effort. Patient may leave the home for infrequent and short duration for medical reasons, and occasional absences for non-medical reasons. Homebound status is due to the following functional limitations: Patient with strength deficits limiting the performance of all ADL's without caregiver assistance or the use of an assistive device. 3. I certify that this patient is under my care and that I, or a nurse practitioner or  [id number], or clinical nurse specialist, or certified nurse midwife, working with me, had a Face-to-Face Encounter that meets the physician Face-to-Face Encounter requirements. The following are the clinical findings from the [address] [place] [address] encounter that support the need for skilled services and is a summary of the encounter: see hospital chart Xena Sotos, BSW 
2/25/2019 THE FOLLOWING TO BE COMPLETED BY THE COMMUNITY PHYSICIAN: 
 
I concur with the findings described above from the [facility] encounter that this patient is homebound and in need of a skilled service. Certifying Physician: _____________________________________ Printed Certifying Physician Name: _____________________________________ Date: _________________

## 2019-02-25 NOTE — PROGRESS NOTES
Care Management Interventions PCP Verified by CM: Yes Mode of Transport at Discharge: Self Transition of Care Consult (CM Consult): Home Health [address]: Yes Physical Therapy Consult: Yes Current Support Network: Lives with Spouse Confirm Follow Up Transport: Family Plan discussed with Pt/Family/Caregiver: Yes Freedom of Choice Offered: Yes Discharge Location Discharge Placement: Home with home health Patient is a 77 yr old male admitted with TIA. He lives with his wife and is still active with Religious and drives. Order rec'd to arrange home health per PT's recommendation. Patient agreeable and confirms he will be home bound. He did not have a preference towards provider. Referral sent to [facility]. He has seen his PCP (NP) with Dr. Juliette Gamez in the last three weeks. Patient has a cane to use with mobility on d/c. Merlene Villalobos

## 2019-02-25 NOTE — PROGRESS NOTES
Patient resting in bed. PIV infusing. Alert and oriented x4. Having a MRI today. Lung sounds clear. Abdomen soft with active bowel sounds. Tele box # [id number]. Ambulates with assistance to bathroom. Voiding yellow clear urine. Bed is low and locked. Call light within reach. Instructed to call for assistance. Verbalized understanding.

## 2019-02-25 NOTE — DISCHARGE SUMMARY
Hospitalist Discharge Summary     Admit Date:  2019  9:03 PM   Name:  Russell Hughes   Age:  77 y.o.  :  1940   MRN:  [id number]   PCP:  Jodi Patel MD  Treatment Team: Attending Provider: Bruna King MD; Consulting Provider: Young Branham MD; Utilization Review: Anna Chow    Problem List for this Hospitalization:  Hospital Problems as of 2019 Date Reviewed: 2018          Codes Class Noted - Resolved POA    PFO (patent foramen ovale) ICD-10-CM: Q21.1  ICD-9-CM: 745.5  2019 - Present Yes        Aneurysm of right carotid artery ([facility] [address] ICD-10-CM: I72.0  ICD-9-CM: 442.81  2019 - Present Unknown    Overview Signed 2019  2:19 PM by Sarah Rao MD     Cervical portion             * (Principal) TIA (transient ischemic attack) ICD-10-CM: G45.9  ICD-9-CM: 435.9  2019 - Present Yes        Hypertension ICD-10-CM: I10  ICD-9-CM: 401.9  2019 - Present Yes        GERD (gastroesophageal reflux disease) ICD-10-CM: K21.9  ICD-9-CM: 530.81  2019 - Present Yes    Overview Signed 2019 11:21 PM by Bruna King MD     hx of, no longer             Altered mental status ICD-10-CM: R41.82  ICD-9-CM: 780.97  2019 - Present Yes        Type II diabetes mellitus ([facility] ICD-10-CM: E11.9  ICD-9-CM: 250.00  2017 - Present Yes                Admission HPI from 2019:    Patient is a 76 y/o male with 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 She reported that he had new expressive aphasia and stuttering speech and is disoriented. Has delayed responses to questions. He is followed by [facility] and takes aspirin and plavix. His only complaint is of a headache tonight. No chest pain or palpitations. Initial non-contrast head CT is negative.  Hospitalist service is consulted for admission and further workup. Hospital Course:  Pt speech back to normal.MRI no stroke. 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 The clinical  significance is uncertain. spoke to - recommended out pt follow up. Spoke to [address] [place] [address] Neurology- no change in medications- cont asa 81 mg and plavix 75 mg. Advised to f/u with cardiology to see if PFO can be closed. Pt worked with PT and OT- recommended home health. pt is stable for discharge. Follow up instructions below. Plan was discussed with pt and wife at bedside. All questions answered. Patient was stable at time of discharge and was instructed to call or return if there are any concerns or recurrence of symptoms. Diagnostic Imaging/Tests:   Mri Brain Wo Cont    Result Date: 2/25/2019  MRI BRAIN WITHOUT CONTRAST 2/25/2019 HISTORY: Transient ischemic attack. Episode of expressive aphasia and stuttering speech with disorientation after fall last night. TECHNIQUE: Sagittal and axial T1-weighted, axial T2-weighted, axial and coronal FLAIR, axial T2-weighted gradient-echo, axial diffusion weighted images with ADC maps of the brain. COMPARISON: Head CT from previous day FINDINGS: There is no acute infarction, intracranial hemorrhage, hydrocephalus, intra-axial mass, extra-axial hematoma, or shift of the midline structures. There is a small focus of T2 shine through in the right frontal white matter superiorly. This may be an old lacunar infarct. On the T2-weighted and FLAIR sequences, there are scattered white matter hyperintensities compatible with sclerotic chronic small vessel ischemic disease. Encephalomalacia is present in the right occipital lobe and posterior inferior right temporal lobe. There is minimal cortical encephalomalacia in the left occipital lobe. Moderate cerebral volume loss is present with bilateral hippocampal atrophy. There is likely mid brain atrophy that is more pronounced than pontine atrophy. Mucosal thickening is present in the right maxillary sinus. IMPRESSION: 1. No acute infarction. 2. Encephalomalacia in the right PCA territory and left occipital cortex. 3. White matter findings compatible with chronic small vessel ischemic disease. 4. At least moderate cerebral volume loss with bilateral hippocampal atrophy. Ct Head Wo Cont    Result Date: 2/24/2019  EXAM: Noncontrast CT head. INDICATION: Expressive aphasia. COMPARISON: None. TECHNIQUE: Noncontrast CT images of the head were obtained. Radiation dose reduction techniques were used for this study. Our CT scanners use one or all of the following:  Automated exposure control, adjustment of the mA or kV according to patient size, iterative reconstruction. FINDINGS: There is moderate volume loss with chronic small vessel ischemic changes in the white matter. No acute infarct, hemorrhage or mass is seen. There is no mass effect, midline shift or evidence of hydrocephalus. The skull and skull base are within normal limits. The mastoids are clear. There is mild mucosal thickening in the right maxillary sinus. IMPRESSION: No acute intracranial process. Cta Head Neck W Wo Cont    Result Date: 2/25/2019  Title:  CT arteriogram of the neck and head. Indication: TIAs. History of occipital stroke. . Technique: Axial images of the neck and head were obtained after the uneventful administration of intravenous iodinated contrast media. Contrast was used to best identify the arterial structures. Images were reviewed on a separate, free standing, three-dimensional workstation as per the referring physicians request.  All stenosis percentages derived by comparing the narrowest segment with the distal Internal Carotid Artery luminal diameter, as described in the [place] American Symptomatic Carotid Endarterectomy Trial (NASCET) criteria.  All CT scans at this facility are performed using dose reduction/dose modulation techniques, as appropriate the performed exam, including the following: Automated Exposure Control; Adjustment of the mA and/or kV according to patient size (this includes techniques or standardized protocols for targeted exams where dose is matched to indication/reason for exam); and Use of Iterative Reconstruction Technique. Comparison: None. Findings: Lungs: Scattered linear opacities, likely atelectasis or scar Soft Tissues: Mucosal thickening of the right maxillary sinus Cervical Spine: Degenerative changes Aorta: Conventional 3 vessel arch. Mild atherosclerotic changes Great Vessels: Patent Right ICA: Mild atherosclerotic changes at the bulb with some tortuosity of the cervical portion. The midportion of the ICA also contains a saccular appearing small aneurysm measuring 9 mm with a depth of 4 mm. The etiology and significance is uncertain. % Stenosis: Less than 25 Right MCA: Patent Right KIRK: Patent Left ICA: Mild atherosclerotic changes also with tortuosity of the cervical portion % Stenosis: Less than 25% Left MCA: Patent Left KIRK: Patent Right Vertebral: Patent Left Vertebral: Patent Dominance: Codominant Basilar: Patent Right PCA: Patent Left PCA: Patent Other Vascular: Negative     Impression: 1. No evidence of large vessel occlusion. 2. No significant ICA stenosis. 3. 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 The clinical significance is uncertain. Both cervical ICAs are quite tortuous, possibly secondary to chronic hypertension. Xr Chest Port    Result Date: 2/24/2019  EXAM: Chest x-ray. INDICATION: Chest pain. COMPARISON: March 2, 2009. TECHNIQUE: Single frontal view chest. FINDINGS: The lungs are clear. No pneumothorax, pulmonary vascular congestion or pleural effusion is identified. The cardiac silhouette is mildly enlarged. Also noted is a small hiatal hernia and elevation of the right diaphragm. IMPRESSION: 1. Clear lungs. 2. Mild cardiomegaly. 3. Hiatal hernia.        Echocardiogram results:  Results for orders placed or performed during the hospital encounter of 19   2D ECHO COMPLETE ADULT (TTE) W OR [address] [facility]  [address], [address] [place]  [phone]    Transthoracic Echocardiogram  2D, M-mode, Doppler, and Color Doppler    Patient: Yoselin Lawson  MR #: [id number]  : 1940  Age: 77 years  Gender: Male  Study date: 2019  Account #: [de-identified]  Height: 70 in  Weight: 238.5 lb  BSA: 2.25 mï¾²  Status:Routine  Location: [facility]  BP: 108/ 68    Allergies: NO KNOWN ALLERGENS    Sonographer:  RAVIN De La Rosa  Group:  [facility]  Referring Physician:  Dirk Dumont MD  Reading Physician:  Unique Barrett. MD Emmy [facility]    INDICATIONS: TIA with transient neurological disturbance  *Some apical images off axis due to orientation of the heart. *    PROCEDURE: This was a routine study. A transthoracic echocardiogram was  performed. The study included complete 2D imaging, M-mode, complete spectral  Doppler, and color Doppler. Intravenous contrast (agitated saline) was  administered. Intravenous contrast (agitated saline) was administered. This   was  a technically difficult study. LEFT VENTRICLE: Size was normal. Systolic function was normal. Ejection  fraction was estimated in the range of 55 % to 60 %. There were no regional  wall motion abnormalities. Wall thickness was normal. Left ventricular  diastolic function parameters were normal. Avg E/e': 8.4. RIGHT VENTRICLE: The size was normal. Systolic function was normal. The  tricuspid jet envelope definition was inadequate for estimation of RV   systolic  pressure. LEFT ATRIUM: The atrium was mildly dilated. ATRIAL SEPTUM: There was a probable patent foramen ovale. Agitated saline  contrast injection (bubble study) was performed. There was a mild   right-to-left  shunt, in the baseline state.     RIGHT ATRIUM: Size was normal.    SYSTEMIC VEINS: IVC: The inferior vena cava was not well visualized. AORTIC VALVE: The valve was structurally normal, tri-commissural. There was   no  evidence for stenosis. There was no insufficiency. MITRAL VALVE: There was mild annular calcification. There was no evidence for  stenosis. There was trivial regurgitation. TRICUSPID VALVE: The valve structure was normal. There was no evidence for  stenosis. There was trivial regurgitation. PULMONIC VALVE: The valve structure was normal. There was no evidence for  stenosis. There was no insufficiency. PERICARDIUM: There was no pericardial effusion. AORTA: The root exhibited normal size. SUMMARY:    -  Procedure information: This was a technically difficult study. -  Left ventricle: Systolic function was normal. Ejection fraction was  estimated in the range of 55 % to 60 %. There were no regional wall motion  abnormalities. -  Left atrium: The atrium was mildly dilated. -  Atrial septum: There was a probable patent foramen ovale. Agitated saline  contrast injection (bubble study) was performed. There was a mild   right-to-left  shunt, in the baseline state. -  Mitral valve: There was mild annular calcification. SYSTEM MEASUREMENT TABLES    2D mode  AoR Diam (2D): 3.6 cm  LA Dimension (2D): 4.3 cm  Left Atrium Systolic Volume Index; Method of Disks, Biplane; 2D mode;: 21.8  ml/m2  IVS/LVPW (2D): 1  IVSd (2D): 1 cm  LVIDd (2D): 4.1 cm  LVIDs (2D): 3.2 cm  LVOT Area (2D): 4.2 cm2  LVPWd (2D): 1.1 cm  RVIDd (2D): 3.1 cm    Tissue Doppler Imaging  LV Peak Early Santiago Tissue Erik; Lateral MA (TDI): 7.5 cm/s  LV Peak Early Santiago Tissue Erik; Medial MA (TDI): 4 cm/s    Unspecified Scan Mode  Peak Grad; Mean; Antegrade Flow: 7 mm[Hg]  Vmax; Antegrade Flow: 134 cm/s  LVOT Diam: 2.3 cm  MV Peak Erik/LV Peak Tissue Erik E-Wave; Lateral MA: 5.8  MV Peak Erik/LV Peak Tissue Erik E-Wave; Medial MA: 11    Prepared and signed by    Naila Bell.  MD Emmy [facility]  Signed 25-Feb-2019 16:42:28           All Micro Results     None          Labs: Results:       BMP, Mg, Phos Recent Labs     02/24/19 2153   *   K 4.3      CO2 23   AGAP 11   BUN 14   CREA 1.25   CA 8.8   *      CBC Recent Labs     02/24/19 2130   WBC 5.5   RBC 4.50   HGB 13.3*   HCT 40.5*      GRANS 81*   LYMPH 7*   EOS 0*   MONOS 11   BASOS 0   IG 0   ANEU 4.5   ABL 0.4*   BILLIE 0.0   ABM 0.6   ABB 0.0   AIG 0.0      LFT Recent Labs     02/24/19 2153   SGOT 24   ALT 20   AP 67   TP 7.6   ALB 3.7   GLOB 3.9*   AGRAT 0.9      Cardiac Testing No results found for: BNPP, BNP, CPK, RCK1, RCK2, RCK3, RCK4, CKMB, CKNDX, CKND1, TROPT, TROIQ   Coagulation Tests No results found for: PTP, INR, APTT   A1c Lab Results   Component Value Date/Time    Hemoglobin A1c 6.9 02/24/2019 09:30 PM      Lipid Panel Lab Results   Component Value Date/Time    Cholesterol, total 96 02/25/2019 03:50 PM    HDL Cholesterol 45 02/25/2019 03:50 PM    LDL, calculated 26 02/25/2019 03:50 PM    VLDL, calculated 25 (H) 02/25/2019 03:50 PM    Triglyceride 125 02/25/2019 03:50 PM    CHOL/HDL Ratio 2.1 02/25/2019 03:50 PM      Thyroid Panel No results found for: T4, T3U, TSH, TSHEXT     Most Recent UA No results found for: COLOR, APPRN, REFSG, ASIF, PROTU, GLUCU, KETU, BILU, BLDU, UROU, FATUMA, LEUKU     No Known Allergies    There is no immunization history on file for this patient.     All Labs from Last 24 Hrs:  Recent Results (from the past 24 hour(s))   CBC WITH AUTOMATED DIFF    Collection Time: 02/24/19  9:30 PM   Result Value Ref Range    WBC 5.5 4.3 - 11.1 K/uL    RBC 4.50 4.23 - 5.6 M/uL    HGB 13.3 (L) 13.6 - 17.2 g/dL    HCT 40.5 (L) 41.1 - 50.3 %    MCV 90.0 79.6 - 97.8 FL    MCH 29.6 26.1 - 32.9 PG    MCHC 32.8 31.4 - 35.0 g/dL    RDW 14.6 11.9 - 14.6 %    PLATELET 715 922 - 020 K/uL    MPV 9.9 9.4 - 12.3 FL    ABSOLUTE NRBC 0.00 0.0 - 0.2 K/uL    DF AUTOMATED      NEUTROPHILS 81 (H) 43 - 78 %    LYMPHOCYTES 7 (L) 13 - 44 %    MONOCYTES 11 4.0 - 12.0 %    EOSINOPHILS 0 (L) 0.5 - 7.8 %    BASOPHILS 0 0.0 - 2.0 %    IMMATURE GRANULOCYTES 0 0.0 - 5.0 %    ABS. NEUTROPHILS 4.5 1.7 - 8.2 K/UL    ABS. LYMPHOCYTES 0.4 (L) 0.5 - 4.6 K/UL    ABS. MONOCYTES 0.6 0.1 - 1.3 K/UL    ABS. EOSINOPHILS 0.0 0.0 - 0.8 K/UL    ABS. BASOPHILS 0.0 0.0 - 0.2 K/UL    ABS. IMM. GRANS. 0.0 0.0 - 0.5 K/UL   HEMOGLOBIN A1C WITH EAG    Collection Time: 02/24/19  9:30 PM   Result Value Ref Range    Hemoglobin A1c 6.9 %    Est. average glucose 369 mg/dL   METABOLIC PANEL, COMPREHENSIVE    Collection Time: 02/24/19  9:53 PM   Result Value Ref Range    Sodium 135 (L) 136 - 145 mmol/L    Potassium 4.3 3.5 - 5.1 mmol/L    Chloride 101 98 - 107 mmol/L    CO2 23 21 - 32 mmol/L    Anion gap 11 mmol/L    Glucose 207 (H) 65 - 100 mg/dL    BUN 14 8 - 23 MG/DL    Creatinine 1.25 0.8 - 1.5 MG/DL    GFR est AA >60 >60 ml/min/1.73m2    GFR est non-AA 59 ml/min/1.73m2    Calcium 8.8 8.3 - 10.4 MG/DL    Bilirubin, total 0.5 0.2 - 1.1 MG/DL    ALT (SGPT) 20 12 - 65 U/L    AST (SGOT) 24 15 - 37 U/L    Alk. phosphatase 67 50 - 136 U/L    Protein, total 7.6 g/dL    Albumin 3.7 3.2 - 4.6 g/dL    Globulin 3.9 (H) 2.3 - 3.5 g/dL    A-G Ratio 0.9     EKG, 12 LEAD, INITIAL    Collection Time: 02/24/19 11:45 PM   Result Value Ref Range    Ventricular Rate 111 BPM    Atrial Rate 111 BPM    P-R Interval 166 ms    QRS Duration 94 ms    Q-T Interval 314 ms    QTC Calculation (Bezet) 427 ms    Calculated P Axis 41 degrees    Calculated R Axis -19 degrees    Calculated T Axis 58 degrees    Diagnosis       !! AGE AND GENDER SPECIFIC ECG ANALYSIS !!   Sinus tachycardia  Nonspecific T wave abnormality  When compared with ECG of 13-FEB-2017 23:20,  Premature ventricular complexes are no longer Present  QRS axis Shifted right  Confirmed by ST MONROE BENITEZ MD (), TONYA LUNA ([id number]) on 2/25/2019 8:35:28 AM     GLUCOSE, POC    Collection Time: 02/25/19  6:14 AM   Result Value Ref Range    Glucose (POC) 132 (H) 65 - 100 mg/dL   GLUCOSE, POC    Collection Time: 02/25/19 11:45 AM   Result Value Ref Range    Glucose (POC) 124 (H) 65 - 100 mg/dL   LIPID PANEL    Collection Time: 02/25/19  3:50 PM   Result Value Ref Range    LIPID PROFILE          Cholesterol, total 96 MG/DL    Triglyceride 125 35 - 150 MG/DL    HDL Cholesterol 45 40 - 60 MG/DL    LDL, calculated 26 <100 MG/DL    VLDL, calculated 25 (H) 6.0 - 23.0 MG/DL    CHOL/HDL Ratio 2.1 <200     GLUCOSE, POC    Collection Time: 02/25/19  4:13 PM   Result Value Ref Range    Glucose (POC) 131 (H) 65 - 100 mg/dL       Discharge Exam:  Patient Vitals for the past 24 hrs:   Temp Pulse Resp BP SpO2   02/25/19 1515 97.7 °F (36.5 °C) 81 18 144/81 92 %   02/25/19 1101 97.7 °F (36.5 °C) 89 18 133/85 92 %   02/25/19 0704 98.2 °F (36.8 °C) 76 18 116/71 95 %   02/25/19 0418 98.2 °F (36.8 °C) 85 18 108/68 92 %   02/25/19 0032 98.4 °F (36.9 °C) (!) 106 19 121/76 91 %   02/24/19 2346  (!) 112  135/82 93 %   02/24/19 2114     94 %   02/24/19 2104 99.3 °F (37.4 °C) (!) 113 19 154/81 96 %     Oxygen Therapy  O2 Sat (%): 92 % (02/25/19 1515)  Pulse via Oximetry: 111 beats per minute (02/24/19 2346)  O2 Device: Nasal cannula (02/24/19 2114)  O2 Flow Rate (L/min): 2 l/min (02/24/19 2114)    Intake/Output Summary (Last 24 hours) at 2/25/2019 1802  Last data filed at 2/25/2019 0620  Gross per 24 hour   Intake    Output 300 ml   Net -300 ml       General:    Well nourished. Alert. No distress. Eyes:   Normal sclera. Extraocular movements intact. ENT:  Normocephalic, atraumatic. Moist mucous membranes  CV:   Regular rate and rhythm. No murmur, rub, or gallop. Lungs:  Clear to auscultation bilaterally. No wheezing, rhonchi, or rales. Abdomen: Soft, nontender, nondistended. Bowel sounds normal.   Extremities: Warm and dry. No cyanosis or edema. Neurologic: CN II-XII grossly intact. Sensation intact. Skin:     No rashes or jaundice.     Psych:  Normal mood and affect. Discharge Info:   Current Discharge Medication List      START taking these medications    Details   clopidogrel (PLAVIX) 75 mg tab Take 1 Tab by mouth daily. Home medication  Qty: 1 Tab, Refills: 0         CONTINUE these medications which have NOT CHANGED    Details   doxylamine succinate (UNISOM) 25 mg tablet Take 25 mg by mouth nightly as needed for Sleep. fluticasone (FLOVENT DISKUS) 50 mcg/actuation inhaler Take  by inhalation. dutasteride (AVODART) 0.5 mg capsule Take 1 Cap by mouth daily for 90 days. 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 Triamcinolone Acetonide (NASACORT AQ) 55 mcg/actuation Aero nasal spray 1 Hana by Both Nostrils route daily. linagliptin (TRADJENTA) 5 mg tablet Take 5 mg by mouth daily. lisinopril (PRINIVIL, ZESTRIL) 10 mg tablet Take 20 mg by mouth daily. guaiFENesin SR (MUCINEX) 600 mg SR tablet Take 600 mg by mouth every evening. METFORMIN PO Take 1,000 mg by mouth two (2) times a day. multivitamin with iron (DAILY MULTI-VITAMINS/IRON) tablet Take 1 Tab by mouth daily. [employer] vitamin pack      GLIMEPIRIDE PO Take 2 mg by mouth daily. Pt is also takes Aspirin 81 mg at home. Disposition: home with home health  Activity: as tolerated with home health  Diet: DIET DIABETIC WITH OPTIONS Consistent Carb 1500-1600kcal; Regular; 2 GM NA (House Low NA); AHA-LOW-CHOL FAT    Follow-up Appointments   Procedures    FOLLOW UP VISIT Appointment in: One Week F/u with pcp in a week with bmp. F/u with cardiology- discuss about PFO closure. F/u with pcp in a week with bmp. F/u with cardiology- discuss about PFO closure.   F/u with Dr.[facility] vascular- in  2 weeks for aneurysm. Standing Status:   Standing     Number of Occurrences:   1     Order Specific Question:   Appointment in     Answer: One Week         Follow-up Information     Follow up With Specialties Details Why Contact Info    Arturo Fritz MD Internal Medicine   [address] [place] [address]  [phone]            Time spent in patient discharge planning and coordination 25 minutes.     Signed:  Whitney Stanley MD

## 2019-02-25 NOTE — ED PROVIDER NOTES
76-year-old male history of cryptogenic right occipital stroke in May 2018, PFO, atrial septal aneurysm, diabetes, hypertension, high cholesterol, GI bleed, presents with confusion and a fall while in the bathroom tonight. He states he had difficulty getting up from the toilet and fell. He has stuttering speech with expressive aphasia which she states is new since yesterday. He is somewhat confused and slow to answer questions, but is alert and oriented. There is no family at the bedside. He takes aspirin and Plavix. He denies any injuries from the fall. No loss of consciousness. Past Medical History:  
Diagnosis Date  Calculus of kidney  Closed left arm fracture 4/30/13  Diabetes mellitus type II   
 doesnt take blood sugars  GERD (gastroesophageal reflux disease)   
 hx of, no longer  hx of Pleurisy 15 yrs ago  hx of Vitamin K deficiency   
 with blood transfusion  Hypertension  Hypertrophy of prostate with urinary obstruction and other lower urinary tract symptoms (LUTS)  Personal history of kidney stones  Pure hypercholesterolemia  Type II diabetes mellitus ([facility] [address] 2/14/2017  Upper GI bleed 2/14/2017  Urgency of urination Past Surgical History:  
Procedure Laterality Date  HX CHOLECYSTECTOMY    
 with hiatal hernia repair  HX HERNIA REPAIR    
 inguinal hernia  HX OTHER SURGICAL    
 pleurisy Family History:  
Problem Relation Age of Onset  Other Mother   
     thyroid  Heart Disease Father Social History Socioeconomic History  Marital status:  Spouse name: Not on file  Number of children: Not on file  Years of education: Not on file  Highest education level: Not on file Social Needs  Financial resource strain: Not on file  Food insecurity - worry: Not on file  Food insecurity - inability: Not on file  Transportation needs - medical: Not on file  Transportation needs - non-medical: Not on file Occupational History  Not on file Tobacco Use  Smoking status: Never Smoker  Smokeless tobacco: Never Used Substance and Sexual Activity  Alcohol use: No  
 Drug use: No  
 Sexual activity: Not on file Other Topics Concern  Not on file Social History Narrative  Not on file ALLERGIES: Patient has no known allergies. Review of Systems Constitutional: Negative for chills and fever. HENT: Negative for hearing loss. Eyes: Negative for visual disturbance. Respiratory: Positive for cough. Negative for shortness of breath. Cardiovascular: Negative for chest pain and palpitations. Gastrointestinal: Negative for abdominal pain, diarrhea, nausea and vomiting. Musculoskeletal: Negative for back pain. Skin: Negative for rash. Neurological: Negative for weakness and headaches. Psychiatric/Behavioral: Positive for confusion and decreased concentration. Vitals:  
 02/24/19 2104 02/24/19 2114 BP: 154/81 Pulse: (!) 113 Resp: 19 Temp: 99.3 °F (37.4 °C) SpO2: 96% 94% Weight: 108.4 kg (239 lb) Height: 5' 10\" (1.778 m) Physical Exam  
Constitutional: He appears well-developed and well-nourished. HENT:  
Head: Normocephalic and atraumatic. Right Ear: External ear normal.  
Left Ear: External ear normal.  
Nose: Nose normal.  
Mouth/Throat: Oropharynx is clear and moist.  
Eyes: Conjunctivae are normal. Pupils are equal, round, and reactive to light. Neck: Normal range of motion. Neck supple. Cardiovascular: Regular rhythm, normal heart sounds and intact distal pulses. Tachycardia present. Pulmonary/Chest: Effort normal and breath sounds normal. No respiratory distress. He has no wheezes. Abdominal: Soft. Bowel sounds are normal. He exhibits no distension. There is no tenderness. Musculoskeletal: Normal range of motion. He exhibits no edema. Neurological: He is alert. He has normal strength. He is disoriented. No cranial nerve deficit or sensory deficit. Skin: Skin is warm and dry. Psychiatric: His behavior is normal. Judgment normal. His speech is delayed. Cognition and memory are impaired. Stuttering speech with difficulty word finding- mild expressive aphasia Nursing note and vitals reviewed. MDM Number of Diagnoses or Management Options Diagnosis management comments: Parts of this document were created using dragon voice recognition software. The chart has been reviewed but errors may still be present. 11:18 PM 
Expressive aphasia significantly improved, but still occasionally has word finding difficulty. He is now oriented and able to say the date is 2019, initially thought it was 2016. Initially was not able to stay his location, and now is able to say [address]. Concerned about TIA or stroke. Patient states he has some word finding difficulties when he is tired, which he told me earlier that it actually all started yesterday. I am unable to get a hold of his wife who called EMS for the mental status changes and the fall. Phone line busy. Hospitalist paged for admission and MRI. Patient still unable to provide a urine sample at this time. Amount and/or Complexity of Data Reviewed Clinical lab tests: reviewed and ordered (Results for orders placed or performed during the hospital encounter of 02/24/19 
-CBC WITH AUTOMATED DIFF Result                      Value             Ref Range WBC                         5.5               4.3 - 11.1 K* 
     RBC                         4.50              4.23 - 5.6 M* HGB                         13.3 (L)          13.6 - 17.2 * HCT                         40.5 (L)          41.1 - 50.3 % MCV                         90.0              79.6 - 97.8 * MCH                         29.6              26.1 - 32.9 * MCHC                        32.8              31.4 - 35.0 * RDW                         14.6              11.9 - 14.6 % PLATELET                    225               150 - 450 K/* MPV                         9.9               9.4 - 12.3 FL ABSOLUTE NRBC               0.00              0.0 - 0.2 K/* DF                          AUTOMATED NEUTROPHILS                 81 (H)            43 - 78 % LYMPHOCYTES                 7 (L)             13 - 44 % MONOCYTES                   11                4.0 - 12.0 % EOSINOPHILS                 0 (L)             0.5 - 7.8 % BASOPHILS                   0                 0.0 - 2.0 % IMMATURE GRANULOCYTES       0                 0.0 - 5.0 %   
     ABS. NEUTROPHILS            4.5               1.7 - 8.2 K/* ABS. LYMPHOCYTES            0.4 (L)           0.5 - 4.6 K/* ABS. MONOCYTES              0.6               0.1 - 1.3 K/* ABS. EOSINOPHILS            0.0               0.0 - 0.8 K/* ABS. BASOPHILS              0.0               0.0 - 0.2 K/* ABS. IMM. GRANS.            0.0               0.0 - 0.5 K/* 
-METABOLIC PANEL, COMPREHENSIVE Result                      Value             Ref Range Sodium                      135 (L)           136 - 145 mm* Potassium                   4.3               3.5 - 5.1 mm* Chloride                    101               98 - 107 mmo* CO2                         23                21 - 32 mmol* Anion gap                   11                mmol/L Glucose                     207 (H)           65 - 100 mg/* BUN                         14                8 - 23 MG/DL Creatinine                  1.25              0.8 - 1.5 MG* 
     GFR est AA                  >60               >60 ml/min/1* GFR est non-AA              59                ml/min/1.73m2 Calcium                     8.8               8.3 - 10.4 M* Bilirubin, total            0.5               0.2 - 1.1 MG* ALT (SGPT)                  20                12 - 65 U/L   
     AST (SGOT)                  24                15 - 37 U/L Alk. phosphatase            67                50 - 136 U/L Protein, total              7.6               g/dL Albumin                     3.7               3.2 - 4.6 g/* Globulin                    3.9 (H)           2.3 - 3.5 g/* A-G Ratio                   0.9                             
) 
Tests in the radiology section of CPT®: ordered and reviewed (Ct Head Wo Cont Result Date: 2/24/2019 EXAM: Noncontrast CT head. INDICATION: Expressive aphasia. COMPARISON: None. TECHNIQUE: Noncontrast CT images of the head were obtained. Radiation dose reduction techniques were used for this study. Our CT scanners use one or all of the following:  Automated exposure control, adjustment of the mA or kV according to patient size, iterative reconstruction. FINDINGS: There is moderate volume loss with chronic small vessel ischemic changes in the white matter. No acute infarct, hemorrhage or mass is seen. There is no mass effect, midline shift or evidence of hydrocephalus. The skull and skull base are within normal limits. The mastoids are clear. There is mild mucosal thickening in the right maxillary sinus. IMPRESSION: No acute intracranial process. Xr Chest [facility] Result Date: 2/24/2019 EXAM: Chest x-ray. INDICATION: Chest pain. COMPARISON: March 2, 2009. TECHNIQUE: Single frontal view chest. FINDINGS: The lungs are clear. No pneumothorax, pulmonary vascular congestion or pleural effusion is identified. The cardiac silhouette is mildly enlarged. Also noted is a small hiatal hernia and elevation of the right diaphragm. IMPRESSION: 1. Clear lungs. 2. Mild cardiomegaly. 3. Hiatal hernia. ) Procedures

## 2019-02-25 NOTE — PROGRESS NOTES
TRANSFER - IN REPORT: 
 
Verbal report received from Brijesh Lee RN(name) on Edd Bueno  being received from ED(unit) for routine progression of care Report consisted of patients Situation, Background, Assessment and  
Recommendations(SBAR). Information from the following report(s) SBAR, ED Summary and MAR was reviewed with the receiving nurse. Opportunity for questions and clarification was provided. Assessment completed upon patients arrival to unit and care assumed.

## 2019-02-25 NOTE — PROGRESS NOTES
Problem: Dysphagia (Adult) Goal: *Acute Goals and Plan of Care (Insert Text) Goals None Outcome: Resolved/Met Date Met: 02/25/19 Speech language pathology: bedside swallow note: Initial Assessment and Discharge NAME/AGE/GENDER: Karin Burden is a 77 y.o. male DATE: 2/25/2019 PRIMARY DIAGNOSIS: TIA (transient ischemic attack) [G45.9] ICD-10: Treatment Diagnosis: R13.12 Dysphagia, Oropharyngeal Phase INTERDISCIPLINARY COLLABORATION:   
PRECAUTIONS/ALLERGIES: Patient has no known allergies. ASSESSMENT:Based on the objective data described below, Mr. Loy Mcghee presents with swallow function within normal limits. No s/sx of dysphagia with any/all presentations. Patient reports episode of aphasia s/p fall which has resolved. No noticeable deficits on speech/language screening. Continue regular diet textures/thin liquids. No further skilled speech therapy services indicated at this time. ?????? ? ? This section established at most recent assessment?????????? 
PROBLEM LIST (Impairments causing functional limitations): 
1. ? TIA PLAN OF CARE:  
Patient will benefit from skilled intervention to address the following impairments. RECOMMENDATIONS AND PLANNED INTERVENTIONS (Benefits and precautions of therapy have been discussed with the patient.): 
· continue prescribed diet MEDICATIONS: 
· With liquid ASPIRATION PRECAUTIONS: 
· Slow rate of intake · Small bites/sips · Upright at 90 degrees during meal 
COMPENSATORY STRATEGIES/MODIFICATIONS INCLUDING: 
· None OTHER RECOMMENDATIONS (including follow up treatment recommendations):  
· Patient education RECOMMENDED DIET MODIFICATIONS DISCUSSED WITH: 
· Patient FREQUENCY/DURATION: No further speech therapy indicated at this time as oropharyngeal swallow function is within normal limits. RECOMMENDED REHABILITATION/EQUIPMENT: (at time of discharge pending progress): Due to the probability of continued deficits (see above) this patient will not likely need continued skilled speech therapy after discharge. SUBJECTIVE:  
\"I'm doing better, I hope to go home today\" History of Present Injury/Illness: Mr. Albert Reed  has a past medical history of Calculus of kidney, Closed left arm fracture (4/30/13), Diabetes mellitus type II, GERD (gastroesophageal reflux disease), hx of Pleurisy (15 yrs ago), hx of Vitamin K deficiency, Hypertension, Hypertrophy of prostate with urinary obstruction and other lower urinary tract symptoms (LUTS), Personal history of kidney stones, Pure hypercholesterolemia, TIA (transient ischemic attack) (2/24/2019), Type II diabetes mellitus ([facility] [address] (2/14/2017), Upper GI bleed (2/14/2017), and Urgency of urination. He also has no past medical history of Dementia, Infectious disease, or Sleep disorder. Imani Bunch He also  has a past surgical history that includes hx other surgical; hx cholecystectomy; and hx hernia repair. Present Symptoms:   
Pain Scale 1: Visual 
Pain Intensity 1: 0 Current Medications: No current facility-administered medications on file prior to encounter. Current Outpatient Medications on File Prior to Encounter Medication Sig Dispense Refill  doxylamine succinate (UNISOM) 25 mg tablet Take 25 mg by mouth nightly as needed for Sleep.  fluticasone (FLOVENT DISKUS) 50 mcg/actuation inhaler Take  by inhalation.  dutasteride (AVODART) 0.5 mg capsule Take 1 Cap by mouth daily for 90 days. 90 Cap 4  tamsulosin (FLOMAX) 0.4 mg capsule Take 1 Cap by mouth nightly for 90 days. 90 Cap 4  
 simvastatin (ZOCOR) 20 mg tablet Take 20 mg by mouth nightly.  Triamcinolone Acetonide (NASACORT AQ) 55 mcg/actuation Aero nasal spray 1 [place] by Both Nostrils route daily.  linagliptin (TRADJENTA) 5 mg tablet Take 5 mg by mouth daily.  lisinopril (PRINIVIL, ZESTRIL) 10 mg tablet Take 20 mg by mouth daily.  guaiFENesin SR (MUCINEX) 600 mg SR tablet Take 600 mg by mouth every evening.  METFORMIN PO Take 1,000 mg by mouth two (2) times a day.  multivitamin with iron (DAILY MULTI-VITAMINS/IRON) tablet Take 1 Tab by mouth daily. [employer] vitamin pack  GLIMEPIRIDE PO Take 2 mg by mouth daily. Current Dietary Status:   
 Regular diet textures/thin liquids History of reflux:  NO  
? Reflux medication: 
Social History/Home Situation:   
Home Environment: Private residence # Steps to Enter: 3 Rails to Enter: Yes Hand Rails : Left One/Two Story Residence: Two story, live on 1st floor Living Alone: No 
Support Systems: Spouse/Significant Other/Partner Patient Expects to be Discharged to[de-identified] Private residence Current DME Used/Available at Home: Cane, straight Tub or Shower Type: Shower OBJECTIVE:  
Respiratory Status: CXR Results:clear lungs, hiatal hernia MRI Results:IMPRESSION: 
  
1. No acute infarction. 
  
2. Encephalomalacia in the right PCA territory and left occipital cortex. 
  
3. White matter findings compatible with chronic small vessel ischemic disease. 
  
4. At least moderate cerebral volume loss with bilateral hippocampal atrophy. Oral Motor Structure/Speech:  Oral-Motor Structure/Motor Speech Labial: No impairment Dentition: Intact Oral Hygiene: good Lingual: No impairment Cognitive and Communication Status: 
Neurologic State: Alert Orientation Level: Oriented X4 Cognition: Appropriate decision making Perception: Appears intact Perseveration: No perseveration noted Safety/Judgement: Awareness of environment BEDSIDE SWALLOW EVALUATIONOral Assessment: 
Oral Assessment Labial: No impairment Dentition: Intact Oral Hygiene: good Lingual: No impairment P.O. Trials: 
Patient Position: upright in bed The patient was given bite/sip amounts of the following:  
Consistency Presented: All consistencies How Presented: Self-fed/presented;Cup/sip;Spoon;Straw;Successive swallows ORAL PHASE: 
Bolus Acceptance: No impairment Bolus Formation/Control: No impairment Propulsion: No impairment Oral Residue: None PHARYNGEAL PHASE: 
Initiation of Swallow: No impairment Laryngeal Elevation: Functional 
Aspiration Signs/Symptoms: None Vocal Quality: No impairment Pharyngeal Phase Characteristics: No impairment, issues, or problems OTHER OBSERVATIONS: 
Rate/bite size: WNL Endurance: WNL Comments: Tool Used: Dysphagia Outcome and Severity Scale (SHERLYN) Score Comments Normal Diet  [x] 7 With no strategies or extra time needed Functional Swallow  [] 6 May have mild oral or pharyngeal delay Mild Dysphagia 
  [] 5 Which may require one diet consistency restricted (those who demonstrate penetration which is entirely cleared on MBS would be included) Mild-Moderate Dysphagia  [] 4 With 1-2 diet consistencies restricted Moderate Dysphagia  [] 3 With 2 or more diet consistencies restricted Moderately Severe Dysphagia  [] 2 With partial PO strategies (trials with ST only) Severe Dysphagia  [] 1 With inability to tolerate any PO safely Score:  Initial: 7 Most Recent: X (Date: --) Interpretation of Tool: The Dysphagia Outcome and Severity Scale (SHERLYN) is a simple, easy-to-use, 7-point scale developed to systematically rate the functional severity of dysphagia based on objective assessment and make recommendations for diet level, independence level, and type of nutrition. Payor: [employer]. / Plan: [place] [employer]. / Product Type: Commerical /  
 
TREATMENT:  
 (In addition to Assessment/Re-Assessment sessions the following treatments were rendered) Assessment/Reassessment only, no treatment provided today MODALITIES:  
  
  
  
  
  
  
  
  
  
  
    
  
  
  
  
  
  
  
  
   
 
ORAL MOTOR  EXERCISES: 
  
  
  
  
  
  
  
  
  
  
  
  
  
  
  
  
  
  
  
  
  
  
  
  
  
  
    
  
  
  
  
  
  
  
  
  
  
  
  
  
  
  
  
  
  
  
  
 LARYNGEAL / PHARYNGEAL EXERCISES: 
  
  
  
  
  
  
  
  
  
  
  
  
  
  
  
  
  
  
  
  
  
    
  
  
  
  
  
  
  
  
  
  
  
  
  
  
  
  
  
  
  
   
 
__________________________________________________________________________________________________ Safety: After treatment position/precautions: 
· Call light within reach · Upright in Bed Treatment Assessment:  Patient actively participated in evaluation. Progression/Medical Necessity: · Discontinue Compliance with Program/Exercises: Will assess as treatment progresses Reason for Continuation of Services/Other Comments: 
· discontinue Recommendations/Intent for next treatment session: Discontinue Total Treatment Duration: 
Time In: 0986 Time Out: 1434 Render Marlon.  Buffy, MS, CCC-SLP

## 2019-02-25 NOTE — H&P
Hospitalist H&P/Consult Note Admit Date:  2019  9:03 PM  
Name:  Roney Sanchez Age:  77 y.o. 
:  1940 MRN:  [id number] PCP:  Vipul Sanz MD 
Treatment Team: Attending Provider: Nilesh Wynne MD 
 
HPI:  
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 She reported that he had new expressive aphasia and stuttering speech and is disoriented. Has delayed responses to questions. He is followed by [facility] and takes aspirin and plavix. His only complaint is of a headache tonight. No chest pain or palpitations. Initial non-contrast head CT is negative. Hospitalist service is consulted for admission and further workup. 10 systems reviewed and negative except as noted in HPI. Past Medical History:  
Diagnosis Date  Calculus of kidney  Closed left arm fracture 13  Diabetes mellitus type II   
 doesnt take blood sugars  GERD (gastroesophageal reflux disease)   
 hx of, no longer  hx of Pleurisy 15 yrs ago  hx of Vitamin K deficiency   
 with blood transfusion  Hypertension  Hypertrophy of prostate with urinary obstruction and other lower urinary tract symptoms (LUTS)  Personal history of kidney stones  Pure hypercholesterolemia  TIA (transient ischemic attack) 2019  Type II diabetes mellitus ([facility] [address] 2017  Upper GI bleed 2017  Urgency of urination Past Surgical History:  
Procedure Laterality Date  HX CHOLECYSTECTOMY    
 with hiatal hernia repair  HX HERNIA REPAIR    
 inguinal hernia  HX OTHER SURGICAL    
 pleurisy Prior to Admission Medications Prescriptions Last Dose Informant Patient Reported? Taking? GLIMEPIRIDE PO   Yes No  
Sig: Take 2 mg by mouth daily. METFORMIN PO   Yes Yes Sig: Take 1,000 mg by mouth two (2) times a day. Triamcinolone Acetonide (NASACORT AQ) 55 mcg/actuation Aero nasal spray   Yes Yes [place] by Both Nostrils route daily. doxylamine succinate (UNISOM) 25 mg tablet   Yes Yes Sig: Take 25 mg by mouth nightly as needed for Sleep. dutasteride (AVODART) 0.5 mg capsule   No Yes Sig: Take 1 Cap by mouth daily for 90 days. fluticasone (FLOVENT DISKUS) 50 mcg/actuation inhaler   Yes Yes Sig: Take  by inhalation. guaiFENesin SR (MUCINEX) 600 mg SR tablet   Yes Yes Sig: Take 600 mg by mouth every evening. linagliptin (TRADJENTA) 5 mg tablet   Yes Yes Sig: Take 5 mg by mouth daily. lisinopril (PRINIVIL, ZESTRIL) 10 mg tablet   Yes Yes Sig: Take 20 mg by mouth daily. multivitamin with iron (DAILY MULTI-VITAMINS/IRON) tablet   Yes No  
Sig: Take 1 Tab by mouth daily. [facility] vitamin pack  
simvastatin (ZOCOR) 20 mg tablet   Yes Yes Sig: Take 20 mg by mouth nightly. tamsulosin (FLOMAX) 0.4 mg capsule   No Yes Sig: Take 1 Cap by mouth nightly for 90 days. Facility-Administered Medications: None No Known Allergies Social History Tobacco Use  Smoking status: Never Smoker  Smokeless tobacco: Never Used Substance Use Topics  Alcohol use: No  
  
Family History Problem Relation Age of Onset  Other Mother   
     thyroid  Heart Disease Father There is no immunization history on file for this patient. Objective:  
 
Patient Vitals for the past 24 hrs: 
 Temp Pulse Resp BP SpO2  
19 98.4 °F (36.9 °C) (!) 106 19 121/76 91 % 19  (!) 112  135/82 93 % 19     94 % 19 99.3 °F (37.4 °C) (!) 113 19 154/81 96 % Oxygen Therapy O2 Sat (%): 91 % (19) Pulse via Oximetry: 111 beats per minute (19) O2 Device: Nasal cannula (19) O2 Flow Rate (L/min): 2 l/min (19) No intake or output data in the 24 hours ending 02/25/19 0217 Physical Exam: 
General:    Well nourished. Alert. Confused, disoriented Eyes:   Normal sclera. Extraocular movements intact. ENT:  Normocephalic, atraumatic. Moist mucous membranes CV:   Tachycardic . No murmur, rub, or gallop. Lungs:  CTAB. No wheezing, rhonchi, or rales. Abdomen: Soft, nontender, nondistended. Bowel sounds normal.  
Extremities: Warm and dry. No cyanosis or edema. Neurologic: CN II-XII grossly intact. Sensation intact. Stuttering speech with some expressive aphasia Skin:     No rashes or jaundice. No wounds. Psych:  Normal mood and flat affect. I reviewed the labs, imaging, EKGs, telemetry, and other studies done this admission. Data Review:  
Recent Results (from the past 24 hour(s)) CBC WITH AUTOMATED DIFF Collection Time: 02/24/19  9:30 PM  
Result Value Ref Range WBC 5.5 4.3 - 11.1 K/uL  
 RBC 4.50 4.23 - 5.6 M/uL  
 HGB 13.3 (L) 13.6 - 17.2 g/dL HCT 40.5 (L) 41.1 - 50.3 % MCV 90.0 79.6 - 97.8 FL  
 MCH 29.6 26.1 - 32.9 PG  
 MCHC 32.8 31.4 - 35.0 g/dL  
 RDW 14.6 11.9 - 14.6 % PLATELET 456 567 - 569 K/uL MPV 9.9 9.4 - 12.3 FL ABSOLUTE NRBC 0.00 0.0 - 0.2 K/uL  
 DF AUTOMATED NEUTROPHILS 81 (H) 43 - 78 % LYMPHOCYTES 7 (L) 13 - 44 % MONOCYTES 11 4.0 - 12.0 % EOSINOPHILS 0 (L) 0.5 - 7.8 % BASOPHILS 0 0.0 - 2.0 % IMMATURE GRANULOCYTES 0 0.0 - 5.0 %  
 ABS. NEUTROPHILS 4.5 1.7 - 8.2 K/UL  
 ABS. LYMPHOCYTES 0.4 (L) 0.5 - 4.6 K/UL  
 ABS. MONOCYTES 0.6 0.1 - 1.3 K/UL  
 ABS. EOSINOPHILS 0.0 0.0 - 0.8 K/UL  
 ABS. BASOPHILS 0.0 0.0 - 0.2 K/UL  
 ABS. IMM. GRANS. 0.0 0.0 - 0.5 K/UL HEMOGLOBIN A1C WITH EAG Collection Time: 02/24/19  9:30 PM  
Result Value Ref Range Hemoglobin A1c 6.9 % Est. average glucose 151 mg/dL METABOLIC PANEL, COMPREHENSIVE Collection Time: 02/24/19  9:53 PM  
Result Value Ref Range  Sodium 135 (L) 136 - 145 mmol/L  
 Potassium 4.3 3.5 - 5.1 mmol/L Chloride 101 98 - 107 mmol/L  
 CO2 23 21 - 32 mmol/L Anion gap 11 mmol/L Glucose 207 (H) 65 - 100 mg/dL BUN 14 8 - 23 MG/DL Creatinine 1.25 0.8 - 1.5 MG/DL  
 GFR est AA >60 >60 ml/min/1.73m2 GFR est non-AA 59 ml/min/1.73m2 Calcium 8.8 8.3 - 10.4 MG/DL Bilirubin, total 0.5 0.2 - 1.1 MG/DL  
 ALT (SGPT) 20 12 - 65 U/L  
 AST (SGOT) 24 15 - 37 U/L Alk. phosphatase 67 50 - 136 U/L Protein, total 7.6 g/dL Albumin 3.7 3.2 - 4.6 g/dL Globulin 3.9 (H) 2.3 - 3.5 g/dL A-G Ratio 0.9 Imaging Studies: CXR Results  (Last 48 hours) 02/24/19 2250  XR CHEST PORT Final result Impression:  IMPRESSION:   
1. Clear lungs. 2. Mild cardiomegaly. 3. Hiatal hernia. Narrative:  EXAM: Chest x-ray. INDICATION: Chest pain. COMPARISON: March 2, 2009. TECHNIQUE: Single frontal view chest.  
   
FINDINGS: The lungs are clear. No pneumothorax, pulmonary vascular congestion or  
pleural effusion is identified. The cardiac silhouette is mildly enlarged. Also  
noted is a small hiatal hernia and elevation of the right diaphragm. CT Results  (Last 48 hours) 02/24/19 2242  CT HEAD WO CONT Final result Impression:  IMPRESSION: No acute intracranial process. Narrative:  EXAM: Noncontrast CT head. INDICATION: Expressive aphasia. COMPARISON: None. TECHNIQUE: Noncontrast CT images of the head were obtained. Radiation dose  
reduction techniques were used for this study. Our CT scanners use one or all  
of the following:  Automated exposure control, adjustment of the mA or kV  
according to patient size, iterative reconstruction. FINDINGS: There is moderate volume loss with chronic small vessel ischemic  
changes in the white matter. No acute infarct, hemorrhage or mass is seen. There is no mass effect, midline shift or evidence of hydrocephalus. The skull and skull base are within normal limits. The mastoids are clear. There is mild  
mucosal thickening in the right maxillary sinus. Assessment and Plan:  
 
Hospital Problems as of 2/25/2019 Date Reviewed: 12/17/2018 Codes Class Noted - Resolved POA * (Principal) TIA (transient ischemic attack) ICD-10-CM: G45.9 ICD-9-CM: 435.9  2/24/2019 - Present Yes Altered mental status ICD-10-CM: R41.82 
ICD-9-CM: 780.97  2/24/2019 - Present Yes  
   
 PFO (patent foramen ovale) ICD-10-CM: Q21.1 ICD-9-CM: 745.5  2/25/2019 - Present Yes Hypertension ICD-10-CM: I10 
ICD-9-CM: 401.9  2/24/2019 - Present Yes GERD (gastroesophageal reflux disease) ICD-10-CM: K21.9 ICD-9-CM: 530.81  2/24/2019 - Present Yes Overview Signed 2/24/2019 11:21 PM by Sourav Kemp MD  
  hx of, no longer [facility] II diabetes [facility] ICD-10-CM: E11.9 ICD-9-CM: 250.00  2/14/2017 - Present Yes PLAN: 
· Admit as observation to remote telemetry · TIA/CVA workup to include MRI brain, CTA head and neck and echocardiogram 
· Gentle IV fluid hydration before CT dye load in am 
· He had an occipital CVA in May 2018 that was felt to be cryptogenic but workup revealed a PFO · Continue aspirin and plavix · Neurology consultation · Continue statin. Check lipid status, thyroid functions · Continue diabetes management 
· PT/OT/speech consults · Case management consult · SQ lovenox for DVT prophylaxis Estimated LOS:  Pending clinical course Signed: 
Audelia Toure MD

## 2019-02-25 NOTE — PROGRESS NOTES
Problem: Self Care Deficits Care Plan (Adult) Goal: *Acute Goals and Plan of Care (Insert Text) Patient will complete lower body dressing with supervision to increase self care independence. -GOAL MET 2/25/2019 Patient will complete toileting with supervision to increase self care independence. -GOAL MET 2/25/2019 Patient will complete all functional transfers with supervision using adaptive equipment as needed. -GOAL MET 2/25/2019 Timeframe: 1 visit OCCUPATIONAL THERAPY: Initial Assessment and Discharge 2/25/2019OBSERVATION:   
Payor: Lorena Suarez / Plan: [employer] / Product Type: Commerical /  
  
NAME/AGE/GENDER: Bianca Meier is a 77 y.o. male PRIMARY DIAGNOSIS:  TIA (transient ischemic attack) [G45.9] TIA (transient ischemic attack) TIA (transient ischemic attack) ICD-10: Treatment Diagnosis:  
 · Other lack of cordination (R27.8) · Difficulty in walking, Not elsewhere classified (R26.2) Precautions/Allergies: 
   Patient has no known allergies. ASSESSMENT:  
Mr. Albert Reed presents supine in bed. Confusion, speech, and aphasia have cleared up and appear normal. He uses a cane at times at home and shows fair balance as expected at time of evaluation. He is supervision for all ADL's. Vision is normal. BUE also equal and WNL. Patient will be safe to return home with spouse for self cares. D/C OT for acute deficits. This section established at most recent assessment PROBLEM LIST (Impairments causing functional limitations): 1. Decreased Strength INTERVENTIONS PLANNED: (Benefits and precautions of occupational therapy have been discussed with the patient.) TREATMENT PLAN: Frequency/Duration: Follow patient evaluation only to address above goals. Rehabilitation Potential For Stated Goals: Good RECOMMENDED REHABILITATION/EQUIPMENT: (at time of discharge pending progress): Due to the probability of continued deficits (see above) this patient will not likely need continued skilled occupational therapy after discharge. Equipment:  
? None at this time OCCUPATIONAL PROFILE AND HISTORY:  
History of Present Injury/Illness (Reason for Referral): 
See H&P. 5/2018 had a CVA, reports no residual deficits. Past Medical History/Comorbidities:  
Mr. Consuelo Stratton  has a past medical history of Calculus of kidney, Closed left arm fracture (4/30/13), Diabetes mellitus type II, GERD (gastroesophageal reflux disease), hx of Pleurisy (15 yrs ago), hx of Vitamin K deficiency, Hypertension, Hypertrophy of prostate with urinary obstruction and other lower urinary tract symptoms (LUTS), Personal history of kidney stones, Pure hypercholesterolemia, TIA (transient ischemic attack) (2/24/2019), Type II diabetes mellitus ([facility] [address] (2/14/2017), Upper GI bleed (2/14/2017), and Urgency of urination. He also has no past medical history of Dementia, Infectious disease, or Sleep disorder. Mr. Consuelo Stratton  has a past surgical history that includes hx other surgical; hx cholecystectomy; and hx hernia repair. Social History/Living Environment:  
Home Environment: Private residence # Steps to Enter: 3 Rails to Enter: Yes One/Two Story Residence: Other (Comment)(3 stories, lives on first) Living Alone: No 
Support Systems: Spouse/Significant Other/Partner Patient Expects to be Discharged to[de-identified] Private residence Current DME Used/Available at Home: Cane, straight Tub or Shower Type: Shower Prior Level of Function/Work/Activity: 
Independent with all ADL's. He does laundry, spouse cleans. He is still driving. Number of Personal Factors/Comorbidities that affect the Plan of Care: Brief history (0):  LOW COMPLEXITY ASSESSMENT OF OCCUPATIONAL PERFORMANCE[de-identified]  
Activities of Daily Living:  
Basic ADLs (From Assessment) Complex ADLs (From Assessment) Feeding: Independent Oral Facial Hygiene/Grooming: Independent Bathing: Supervision Upper Body Dressing: Supervision Lower Body Dressing: Setup Toileting: Supervision Grooming/Bathing/Dressing Activities of Daily Living Bed/Mat Mobility Supine to Sit: Independent Sit to Stand: Supervision Bed to Chair: Supervision Most Recent Physical Functioning:  
Gross Assessment: 
  
         
  
Posture: 
  
Balance: 
Sitting: Intact Standing: With support Bed Mobility: 
Supine to Sit: Independent Wheelchair Mobility: 
  
Transfers: 
Sit to Stand: Supervision Stand to Sit: Supervision Bed to Chair: Supervision Patient Vitals for the past 6 hrs: 
 BP BP Patient Position SpO2 Pulse 19 0704 116/71 At rest 95 % 76 Mental Status Neurologic State: Alert Orientation Level: Oriented X4 Cognition: Appropriate decision making Perception: Appears intact Physical Skills Involved: 
1. Balance Cognitive Skills Affected (resulting in the inability to perform in a timely and safe manner): 1. wfl Psychosocial Skills Affected: 1. wfl Number of elements that affect the Plan of Care: 1-3:  LOW COMPLEXITY CLINICAL DECISION MAKIN[address] [place] [id number] AM-PAC 6 Clicks Daily Activity Inpatient Short Form How much help from another person does the patient currently need. .. Total A Lot A Little None 1. Putting on and taking off regular lower body clothing? [] 1   [] 2   [] 3   [x] 4  
2. Bathing (including washing, rinsing, drying)? [] 1   [] 2   [] 3   [x] 4  
3. Toileting, which includes using toilet, bedpan or urinal?   [] 1   [] 2   [] 3   [x] 4  
4. Putting on and taking off regular upper body clothing? [] 1   [] 2   [] 3   [x] 4  
5. Taking care of personal grooming such as brushing teeth? [] 1   [] 2   [] 3   [x] 4  
6. Eating meals? [] 1   [] 2   [] 3   [x] 4  
© , Trustees of [address] [place] [address] [address] [address], under license to [employer].  All rights reserved Score:  Initial: 24 Most Recent: X (Date: -- ) Interpretation of Tool:  Represents activities that are increasingly more difficult (i.e. Bed mobility, Transfers, Gait). Medical Necessity:    
· Skilled intervention continues to be required due to deficits listed above. Reason for Services/Other Comments: 
· Patient continues to require skilled intervention due to TIA. Use of outcome tool(s) and clinical judgement create a POC that gives a: LOW COMPLEXITY  
 
 
 
TREATMENT:  
(In addition to Assessment/Re-Assessment sessions the following treatments were rendered) Pre-treatment Symptoms/Complaints:   
Pain: Initial:  
Pain Intensity 1: 0  Post Session:  0 Assessment/Reassessment only, no treatment provided today Braces/Orthotics/Lines/Etc:  
· IV 
· O2 Device: Nasal cannula Treatment/Session Assessment:   
· Response to Treatment:  Good · Interdisciplinary Collaboration:  
o Physical Therapist 
o Occupational Therapist 
o Registered Nurse · After treatment position/precautions:  
o Supine in bed · Compliance with Program/Exercises: Compliant all of the time. Recommendations/Intent for next treatment session:  D/C for acute deficits. Total Treatment Duration: OT Patient Time In/Time Out Time In: 0644 Time Out: 2353 Aidee Santamaria OT

## 2019-02-25 NOTE — ED NOTES
TRANSFER - OUT REPORT: 
 
Verbal report given to Eusebia Kemp RN (name) on Iesha Benson  being transferred to [address] [place] [address] (unit) for routine progression of care Report consisted of patients Situation, Background, Assessment and  
Recommendations(SBAR). Information from the following report(s) SBAR was reviewed with the receiving nurse. Lines:  
Peripheral IV 02/24/19 Left Forearm (Active) Site Assessment Clean, dry, & intact 2/24/2019  9:12 PM  
Phlebitis Assessment 0 2/24/2019  9:12 PM  
Infiltration Assessment 0 2/24/2019  9:12 PM  
Dressing Status Clean, dry, & intact 2/24/2019  9:12 PM  
  
 
Opportunity for questions and clarification was provided. Patient transported with: 
 O2 @ 2 liters

## 2019-02-27 ENCOUNTER — HOME CARE VISIT (OUTPATIENT)
Dept: SCHEDULING | Facility: HOME HEALTH | Age: 79
End: 2019-02-27
Payer: MEDICARE

## 2019-02-27 VITALS
WEIGHT: 240 LBS | BODY MASS INDEX: 34.36 KG/M2 | RESPIRATION RATE: 18 BRPM | HEART RATE: 70 BPM | DIASTOLIC BLOOD PRESSURE: 62 MMHG | TEMPERATURE: 98.5 F | HEIGHT: 70 IN | SYSTOLIC BLOOD PRESSURE: 112 MMHG

## 2019-02-27 PROCEDURE — 400013 HH SOC

## 2019-02-27 PROCEDURE — G0151 HHCP-SERV OF PT,EA 15 MIN: HCPCS

## 2019-02-27 PROCEDURE — 3331090001 HH PPS REVENUE CREDIT

## 2019-02-27 PROCEDURE — 3331090002 HH PPS REVENUE DEBIT

## 2019-02-27 PROCEDURE — 3331090003 HH PPS REVENUE ADJ

## 2019-02-28 PROCEDURE — 3331090001 HH PPS REVENUE CREDIT

## 2019-02-28 PROCEDURE — 3331090002 HH PPS REVENUE DEBIT

## 2019-03-01 PROCEDURE — 3331090002 HH PPS REVENUE DEBIT

## 2019-03-01 PROCEDURE — 3331090001 HH PPS REVENUE CREDIT

## 2019-03-02 PROCEDURE — 3331090002 HH PPS REVENUE DEBIT

## 2019-03-02 PROCEDURE — 3331090001 HH PPS REVENUE CREDIT

## 2019-03-03 PROCEDURE — 3331090002 HH PPS REVENUE DEBIT

## 2019-03-03 PROCEDURE — 3331090001 HH PPS REVENUE CREDIT

## 2019-03-04 PROCEDURE — 3331090002 HH PPS REVENUE DEBIT

## 2019-03-04 PROCEDURE — 3331090001 HH PPS REVENUE CREDIT

## 2019-03-05 PROCEDURE — 3331090001 HH PPS REVENUE CREDIT

## 2019-03-05 PROCEDURE — 3331090002 HH PPS REVENUE DEBIT

## 2019-03-06 PROCEDURE — 3331090002 HH PPS REVENUE DEBIT

## 2019-03-06 PROCEDURE — 3331090001 HH PPS REVENUE CREDIT

## 2019-03-07 PROCEDURE — 3331090001 HH PPS REVENUE CREDIT

## 2019-03-07 PROCEDURE — 3331090002 HH PPS REVENUE DEBIT

## 2019-03-08 PROCEDURE — 3331090001 HH PPS REVENUE CREDIT

## 2019-03-08 PROCEDURE — 3331090002 HH PPS REVENUE DEBIT

## 2019-03-09 PROCEDURE — 3331090001 HH PPS REVENUE CREDIT

## 2019-03-09 PROCEDURE — 3331090002 HH PPS REVENUE DEBIT

## 2019-03-10 PROCEDURE — 3331090002 HH PPS REVENUE DEBIT

## 2019-03-10 PROCEDURE — 3331090001 HH PPS REVENUE CREDIT

## 2019-03-11 PROCEDURE — 3331090001 HH PPS REVENUE CREDIT

## 2019-03-11 PROCEDURE — 3331090002 HH PPS REVENUE DEBIT

## 2019-03-12 PROCEDURE — 3331090001 HH PPS REVENUE CREDIT

## 2019-03-12 PROCEDURE — 3331090002 HH PPS REVENUE DEBIT

## 2019-03-13 PROCEDURE — 3331090001 HH PPS REVENUE CREDIT

## 2019-03-13 PROCEDURE — 3331090002 HH PPS REVENUE DEBIT

## 2019-03-14 PROCEDURE — 3331090002 HH PPS REVENUE DEBIT

## 2019-03-14 PROCEDURE — 3331090001 HH PPS REVENUE CREDIT

## 2019-03-15 PROCEDURE — 3331090001 HH PPS REVENUE CREDIT

## 2019-03-15 PROCEDURE — 3331090002 HH PPS REVENUE DEBIT

## 2019-03-16 PROCEDURE — 3331090002 HH PPS REVENUE DEBIT

## 2019-03-16 PROCEDURE — 3331090001 HH PPS REVENUE CREDIT

## 2019-03-17 PROCEDURE — 3331090002 HH PPS REVENUE DEBIT

## 2019-03-17 PROCEDURE — 3331090001 HH PPS REVENUE CREDIT

## 2019-03-18 PROCEDURE — 3331090002 HH PPS REVENUE DEBIT

## 2019-03-18 PROCEDURE — 3331090001 HH PPS REVENUE CREDIT

## 2019-03-19 PROCEDURE — 3331090001 HH PPS REVENUE CREDIT

## 2019-03-19 PROCEDURE — 3331090002 HH PPS REVENUE DEBIT

## 2019-03-20 PROCEDURE — 3331090001 HH PPS REVENUE CREDIT

## 2019-03-20 PROCEDURE — 3331090002 HH PPS REVENUE DEBIT

## 2019-03-21 PROCEDURE — 3331090002 HH PPS REVENUE DEBIT

## 2019-03-21 PROCEDURE — 3331090001 HH PPS REVENUE CREDIT

## 2019-03-22 PROCEDURE — 3331090001 HH PPS REVENUE CREDIT

## 2019-03-22 PROCEDURE — 3331090002 HH PPS REVENUE DEBIT

## 2019-03-23 PROCEDURE — 3331090001 HH PPS REVENUE CREDIT

## 2019-03-23 PROCEDURE — 3331090002 HH PPS REVENUE DEBIT

## 2019-03-24 PROCEDURE — 3331090001 HH PPS REVENUE CREDIT

## 2019-03-24 PROCEDURE — 3331090002 HH PPS REVENUE DEBIT

## 2019-03-25 PROCEDURE — 3331090002 HH PPS REVENUE DEBIT

## 2019-03-25 PROCEDURE — 3331090001 HH PPS REVENUE CREDIT

## 2019-03-26 PROCEDURE — 3331090002 HH PPS REVENUE DEBIT

## 2019-03-26 PROCEDURE — 3331090001 HH PPS REVENUE CREDIT

## 2019-03-27 PROCEDURE — 3331090001 HH PPS REVENUE CREDIT

## 2019-03-27 PROCEDURE — 3331090002 HH PPS REVENUE DEBIT

## 2019-03-28 PROCEDURE — 3331090001 HH PPS REVENUE CREDIT

## 2019-03-28 PROCEDURE — 3331090002 HH PPS REVENUE DEBIT

## 2019-03-29 PROCEDURE — 3331090001 HH PPS REVENUE CREDIT

## 2019-03-29 PROCEDURE — 3331090002 HH PPS REVENUE DEBIT

## 2019-03-30 PROCEDURE — 3331090002 HH PPS REVENUE DEBIT

## 2019-03-30 PROCEDURE — 3331090001 HH PPS REVENUE CREDIT

## 2019-03-31 PROCEDURE — 3331090001 HH PPS REVENUE CREDIT

## 2019-03-31 PROCEDURE — 3331090002 HH PPS REVENUE DEBIT

## 2019-04-01 PROCEDURE — 3331090002 HH PPS REVENUE DEBIT

## 2019-04-01 PROCEDURE — 3331090001 HH PPS REVENUE CREDIT

## 2019-04-02 PROCEDURE — 3331090001 HH PPS REVENUE CREDIT

## 2019-04-02 PROCEDURE — 3331090002 HH PPS REVENUE DEBIT

## 2019-09-16 ENCOUNTER — HOSPITAL ENCOUNTER (OUTPATIENT)
Dept: CT IMAGING | Age: 79
Discharge: HOME | End: 2019-09-16
Attending: NEUROLOGICAL SURGERY
Payer: MEDICARE

## 2019-09-16 DIAGNOSIS — I72.0 PSEUDOANEURYSM OF CAROTID ARTERY (HCC): ICD-10-CM

## 2019-09-16 LAB — CREAT BLD-MCNC: 1 MG/DL (ref 0.8–1.5)

## 2019-09-16 PROCEDURE — 82565 ASSAY OF CREATININE: CPT

## 2019-09-16 PROCEDURE — 70498 CT ANGIOGRAPHY NECK: CPT

## 2019-09-16 PROCEDURE — 74011636320 HC RX REV CODE- 636/320: Performed by: NEUROLOGICAL SURGERY

## 2019-09-16 PROCEDURE — 74011000258 HC RX REV CODE- 258: Performed by: NEUROLOGICAL SURGERY

## 2019-09-16 RX ORDER — SODIUM CHLORIDE 0.9 % (FLUSH) 0.9 %
10 SYRINGE (ML) INJECTION
Status: COMPLETED | OUTPATIENT
Start: 2019-09-16 | End: 2019-09-16

## 2019-09-16 RX ADMIN — Medication 10 ML: at 10:23

## 2019-09-16 RX ADMIN — IOPAMIDOL 70 ML: 755 INJECTION, SOLUTION INTRAVENOUS at 10:22

## 2019-09-16 RX ADMIN — SODIUM CHLORIDE 100 ML: 900 INJECTION, SOLUTION INTRAVENOUS at 10:23

## 2022-04-17 ENCOUNTER — APPOINTMENT (OUTPATIENT)
Dept: CT IMAGING | Facility: HOSPITAL | Age: 82
End: 2022-04-17

## 2022-04-17 ENCOUNTER — APPOINTMENT (OUTPATIENT)
Dept: GENERAL RADIOLOGY | Facility: HOSPITAL | Age: 82
End: 2022-04-17

## 2022-04-17 ENCOUNTER — HOSPITAL ENCOUNTER (INPATIENT)
Facility: HOSPITAL | Age: 82
LOS: 1 days | Discharge: HOME | End: 2022-04-18
Attending: EMERGENCY MEDICINE | Admitting: HOSPITALIST

## 2022-04-17 DIAGNOSIS — I63.9 ACUTE ISCHEMIC STROKE: ICD-10-CM

## 2022-04-17 DIAGNOSIS — R47.01 EXPRESSIVE APHASIA: Primary | ICD-10-CM

## 2022-04-17 DIAGNOSIS — Z86.73 HISTORY OF CVA (CEREBROVASCULAR ACCIDENT): ICD-10-CM

## 2022-04-17 LAB
BUN BLDA-MCNC: 7 MG/DL (ref 8–26)
CA-I BLDA-SCNC: 1.2 MMOL/L (ref 1.2–1.32)
CHLORIDE BLDA-SCNC: 104 MMOL/L (ref 98–109)
CO2 BLDA-SCNC: 20 MMOL/L (ref 24–29)
CREAT BLDA-MCNC: 1 MG/DL (ref 0.6–1.3)
EGFRCR SERPLBLD CKD-EPI 2021: 75.1 ML/MIN/1.73
GLUCOSE BLDC GLUCOMTR-MCNC: 155 MG/DL (ref 70–130)
HCT VFR BLDA CALC: 47 % (ref 38–51)
HGB BLDA-MCNC: 16 G/DL (ref 12–17)
INR PPP: 0.9 (ref 0.8–1.2)
POTASSIUM BLDA-SCNC: 3.9 MMOL/L (ref 3.5–4.9)
PROTHROMBIN TIME: 11.4 SECONDS (ref 12.8–15.2)
SODIUM BLD-SCNC: 139 MMOL/L (ref 138–146)

## 2022-04-17 PROCEDURE — 4A03X5D MEASUREMENT OF ARTERIAL FLOW, INTRACRANIAL, EXTERNAL APPROACH: ICD-10-PCS | Performed by: STUDENT IN AN ORGANIZED HEALTH CARE EDUCATION/TRAINING PROGRAM

## 2022-04-17 PROCEDURE — 85014 HEMATOCRIT: CPT

## 2022-04-17 PROCEDURE — 99285 EMERGENCY DEPT VISIT HI MDM: CPT

## 2022-04-17 PROCEDURE — 70450 CT HEAD/BRAIN W/O DYE: CPT

## 2022-04-17 PROCEDURE — 99223 1ST HOSP IP/OBS HIGH 75: CPT

## 2022-04-17 PROCEDURE — 85610 PROTHROMBIN TIME: CPT

## 2022-04-17 PROCEDURE — 84460 ALANINE AMINO (ALT) (SGPT): CPT | Performed by: EMERGENCY MEDICINE

## 2022-04-17 PROCEDURE — 0042T HC CT CEREBRAL PERFUSION W/WO CONTRAST: CPT

## 2022-04-17 PROCEDURE — 85025 COMPLETE CBC W/AUTO DIFF WBC: CPT | Performed by: EMERGENCY MEDICINE

## 2022-04-17 PROCEDURE — 93005 ELECTROCARDIOGRAM TRACING: CPT | Performed by: EMERGENCY MEDICINE

## 2022-04-17 PROCEDURE — 70496 CT ANGIOGRAPHY HEAD: CPT

## 2022-04-17 PROCEDURE — 85730 THROMBOPLASTIN TIME PARTIAL: CPT | Performed by: EMERGENCY MEDICINE

## 2022-04-17 PROCEDURE — 71045 X-RAY EXAM CHEST 1 VIEW: CPT

## 2022-04-17 PROCEDURE — 80047 BASIC METABLC PNL IONIZED CA: CPT

## 2022-04-17 PROCEDURE — 0 IOPAMIDOL PER 1 ML: Performed by: EMERGENCY MEDICINE

## 2022-04-17 PROCEDURE — 70498 CT ANGIOGRAPHY NECK: CPT

## 2022-04-17 PROCEDURE — 84484 ASSAY OF TROPONIN QUANT: CPT | Performed by: EMERGENCY MEDICINE

## 2022-04-17 PROCEDURE — 84450 TRANSFERASE (AST) (SGOT): CPT | Performed by: EMERGENCY MEDICINE

## 2022-04-17 RX ORDER — ONDANSETRON 2 MG/ML
INJECTION INTRAMUSCULAR; INTRAVENOUS
Status: ACTIVE
Start: 2022-04-17 | End: 2022-04-18

## 2022-04-17 RX ORDER — SODIUM CHLORIDE 0.9 % (FLUSH) 0.9 %
10 SYRINGE (ML) INJECTION AS NEEDED
Status: DISCONTINUED | OUTPATIENT
Start: 2022-04-17 | End: 2022-04-18 | Stop reason: HOSPADM

## 2022-04-17 RX ADMIN — IOPAMIDOL 115 ML: 755 INJECTION, SOLUTION INTRAVENOUS at 23:49

## 2022-04-18 ENCOUNTER — APPOINTMENT (OUTPATIENT)
Dept: MRI IMAGING | Facility: HOSPITAL | Age: 82
End: 2022-04-18

## 2022-04-18 ENCOUNTER — APPOINTMENT (OUTPATIENT)
Dept: CARDIOLOGY | Facility: HOSPITAL | Age: 82
End: 2022-04-18

## 2022-04-18 VITALS
DIASTOLIC BLOOD PRESSURE: 81 MMHG | RESPIRATION RATE: 18 BRPM | HEIGHT: 70 IN | OXYGEN SATURATION: 97 % | TEMPERATURE: 98.8 F | HEART RATE: 86 BPM | SYSTOLIC BLOOD PRESSURE: 117 MMHG | BODY MASS INDEX: 28.89 KG/M2 | WEIGHT: 201.8 LBS

## 2022-04-18 PROBLEM — Z86.73 HISTORY OF CVA (CEREBROVASCULAR ACCIDENT): Status: ACTIVE | Noted: 2022-04-18

## 2022-04-18 PROBLEM — I10 ESSENTIAL HYPERTENSION: Status: ACTIVE | Noted: 2022-04-18

## 2022-04-18 PROBLEM — E11.9 TYPE 2 DIABETES MELLITUS: Status: ACTIVE | Noted: 2022-04-18

## 2022-04-18 PROBLEM — R47.01 EXPRESSIVE APHASIA: Status: ACTIVE | Noted: 2022-04-18

## 2022-04-18 PROBLEM — E78.5 HYPERLIPIDEMIA: Status: ACTIVE | Noted: 2022-04-18

## 2022-04-18 LAB
ALT SERPL W P-5'-P-CCNC: 11 U/L (ref 1–41)
ANION GAP SERPL CALCULATED.3IONS-SCNC: 12 MMOL/L (ref 5–15)
APTT PPP: 29 SECONDS (ref 22–39)
AST SERPL-CCNC: 22 U/L (ref 1–40)
BASOPHILS # BLD AUTO: 0.02 10*3/MM3 (ref 0–0.2)
BASOPHILS # BLD AUTO: 0.03 10*3/MM3 (ref 0–0.2)
BASOPHILS NFR BLD AUTO: 0.3 % (ref 0–1.5)
BASOPHILS NFR BLD AUTO: 0.4 % (ref 0–1.5)
BH CV ECHO MEAS - AO MAX PG (FULL): 3.7 MMHG
BH CV ECHO MEAS - AO MAX PG: 6 MMHG
BH CV ECHO MEAS - AO MEAN PG (FULL): 3 MMHG
BH CV ECHO MEAS - AO MEAN PG: 4 MMHG
BH CV ECHO MEAS - AO ROOT AREA (BSA CORRECTED): 1.9
BH CV ECHO MEAS - AO ROOT AREA: 12.6 CM^2
BH CV ECHO MEAS - AO ROOT DIAM: 4 CM
BH CV ECHO MEAS - AO V2 MAX: 120 CM/SEC
BH CV ECHO MEAS - AO V2 MEAN: 87.5 CM/SEC
BH CV ECHO MEAS - AO V2 VTI: 22.7 CM
BH CV ECHO MEAS - ASC AORTA: 3 CM
BH CV ECHO MEAS - AVA(I,A): 2.2 CM^2
BH CV ECHO MEAS - AVA(I,D): 2.2 CM^2
BH CV ECHO MEAS - AVA(V,A): 2 CM^2
BH CV ECHO MEAS - AVA(V,D): 2 CM^2
BH CV ECHO MEAS - BSA(HAYCOCK): 2.1 M^2
BH CV ECHO MEAS - BSA: 2.1 M^2
BH CV ECHO MEAS - BZI_BMI: 28.8 KILOGRAMS/M^2
BH CV ECHO MEAS - BZI_METRIC_HEIGHT: 177.8 CM
BH CV ECHO MEAS - BZI_METRIC_WEIGHT: 91.2 KG
BH CV ECHO MEAS - EDV(CUBED): 97.3 ML
BH CV ECHO MEAS - EDV(MOD-SP2): 111 ML
BH CV ECHO MEAS - EDV(MOD-SP4): 81 ML
BH CV ECHO MEAS - EDV(TEICH): 97.3 ML
BH CV ECHO MEAS - EF(CUBED): 63.1 %
BH CV ECHO MEAS - EF(MOD-BP): 54.8 %
BH CV ECHO MEAS - EF(MOD-SP2): 57.4 %
BH CV ECHO MEAS - EF(MOD-SP4): 52.3 %
BH CV ECHO MEAS - EF(TEICH): 54.7 %
BH CV ECHO MEAS - ESV(CUBED): 35.9 ML
BH CV ECHO MEAS - ESV(MOD-SP2): 47.3 ML
BH CV ECHO MEAS - ESV(MOD-SP4): 38.6 ML
BH CV ECHO MEAS - ESV(TEICH): 44.1 ML
BH CV ECHO MEAS - FS: 28.3 %
BH CV ECHO MEAS - IVS/LVPW: 0.86
BH CV ECHO MEAS - IVSD: 1.2 CM
BH CV ECHO MEAS - LA DIMENSION: 4.1 CM
BH CV ECHO MEAS - LA/AO: 1
BH CV ECHO MEAS - LAT PEAK E' VEL: 8.9 CM/SEC
BH CV ECHO MEAS - LATERAL E/E' RATIO: 4.2
BH CV ECHO MEAS - LV DIASTOLIC VOL/BSA (35-75): 38.7 ML/M^2
BH CV ECHO MEAS - LV MASS(C)D: 230.2 GRAMS
BH CV ECHO MEAS - LV MASS(C)DI: 110 GRAMS/M^2
BH CV ECHO MEAS - LV MAX PG: 2.3 MMHG
BH CV ECHO MEAS - LV MEAN PG: 1 MMHG
BH CV ECHO MEAS - LV SYSTOLIC VOL/BSA (12-30): 18.5 ML/M^2
BH CV ECHO MEAS - LV V1 MAX: 75.7 CM/SEC
BH CV ECHO MEAS - LV V1 MEAN: 53.7 CM/SEC
BH CV ECHO MEAS - LV V1 VTI: 16.1 CM
BH CV ECHO MEAS - LVIDD: 4.6 CM
BH CV ECHO MEAS - LVIDS: 3.3 CM
BH CV ECHO MEAS - LVLD AP2: 8.6 CM
BH CV ECHO MEAS - LVLD AP4: 8.3 CM
BH CV ECHO MEAS - LVLS AP2: 7.2 CM
BH CV ECHO MEAS - LVLS AP4: 6.9 CM
BH CV ECHO MEAS - LVOT AREA (M): 3.1 CM^2
BH CV ECHO MEAS - LVOT AREA: 3.1 CM^2
BH CV ECHO MEAS - LVOT DIAM: 2 CM
BH CV ECHO MEAS - LVPWD: 1.4 CM
BH CV ECHO MEAS - MV A MAX VEL: 86.5 CM/SEC
BH CV ECHO MEAS - MV DEC TIME: 0.2 SEC
BH CV ECHO MEAS - MV E MAX VEL: 37.3 CM/SEC
BH CV ECHO MEAS - MV E/A: 0.43
BH CV ECHO MEAS - MV MAX PG: 3.7 MMHG
BH CV ECHO MEAS - MV MEAN PG: 1 MMHG
BH CV ECHO MEAS - MV V2 MAX: 96.7 CM/SEC
BH CV ECHO MEAS - MV V2 MEAN: 53.2 CM/SEC
BH CV ECHO MEAS - MV V2 VTI: 18.7 CM
BH CV ECHO MEAS - MVA(VTI): 2.7 CM^2
BH CV ECHO MEAS - SI(AO): 136.4 ML/M^2
BH CV ECHO MEAS - SI(CUBED): 29.4 ML/M^2
BH CV ECHO MEAS - SI(LVOT): 24.2 ML/M^2
BH CV ECHO MEAS - SI(MOD-SP2): 30.5 ML/M^2
BH CV ECHO MEAS - SI(MOD-SP4): 20.3 ML/M^2
BH CV ECHO MEAS - SI(TEICH): 25.4 ML/M^2
BH CV ECHO MEAS - SV(AO): 285.3 ML
BH CV ECHO MEAS - SV(CUBED): 61.4 ML
BH CV ECHO MEAS - SV(LVOT): 50.6 ML
BH CV ECHO MEAS - SV(MOD-SP2): 63.7 ML
BH CV ECHO MEAS - SV(MOD-SP4): 42.4 ML
BH CV ECHO MEAS - SV(TEICH): 53.2 ML
BH CV XLRA - RV BASE: 2.3 CM
BH CV XLRA - RV LENGTH: 6.6 CM
BH CV XLRA - RV MID: 1.7 CM
BH CV XLRA - TDI S': 8.5 CM/SEC
BILIRUB UR QL STRIP: NEGATIVE
BUN SERPL-MCNC: 6 MG/DL (ref 8–23)
BUN/CREAT SERPL: 7.3 (ref 7–25)
CALCIUM SPEC-SCNC: 9 MG/DL (ref 8.6–10.5)
CHLORIDE SERPL-SCNC: 103 MMOL/L (ref 98–107)
CHOLEST SERPL-MCNC: 116 MG/DL (ref 0–200)
CLARITY UR: CLEAR
CO2 SERPL-SCNC: 20 MMOL/L (ref 22–29)
COLOR UR: YELLOW
CREAT SERPL-MCNC: 0.82 MG/DL (ref 0.76–1.27)
DEPRECATED RDW RBC AUTO: 48.5 FL (ref 37–54)
DEPRECATED RDW RBC AUTO: 48.8 FL (ref 37–54)
EGFRCR SERPLBLD CKD-EPI 2021: 87.7 ML/MIN/1.73
EOSINOPHIL # BLD AUTO: 0.02 10*3/MM3 (ref 0–0.4)
EOSINOPHIL # BLD AUTO: 0.08 10*3/MM3 (ref 0–0.4)
EOSINOPHIL NFR BLD AUTO: 0.3 % (ref 0.3–6.2)
EOSINOPHIL NFR BLD AUTO: 1 % (ref 0.3–6.2)
ERYTHROCYTE [DISTWIDTH] IN BLOOD BY AUTOMATED COUNT: 14.1 % (ref 12.3–15.4)
ERYTHROCYTE [DISTWIDTH] IN BLOOD BY AUTOMATED COUNT: 14.2 % (ref 12.3–15.4)
FLUAV SUBTYP SPEC NAA+PROBE: NOT DETECTED
FLUBV RNA ISLT QL NAA+PROBE: NOT DETECTED
GLUCOSE BLDC GLUCOMTR-MCNC: 114 MG/DL (ref 70–130)
GLUCOSE BLDC GLUCOMTR-MCNC: 137 MG/DL (ref 70–130)
GLUCOSE BLDC GLUCOMTR-MCNC: 150 MG/DL (ref 70–130)
GLUCOSE SERPL-MCNC: 136 MG/DL (ref 65–99)
GLUCOSE UR STRIP-MCNC: NEGATIVE MG/DL
HBA1C MFR BLD: 6.2 % (ref 4.8–5.6)
HCT VFR BLD AUTO: 38.4 % (ref 37.5–51)
HCT VFR BLD AUTO: 44.9 % (ref 37.5–51)
HDLC SERPL-MCNC: 46 MG/DL (ref 40–60)
HGB BLD-MCNC: 13.2 G/DL (ref 13–17.7)
HGB BLD-MCNC: 15.1 G/DL (ref 13–17.7)
HGB UR QL STRIP.AUTO: NEGATIVE
HOLD SPECIMEN: NORMAL
IMM GRANULOCYTES # BLD AUTO: 0.02 10*3/MM3 (ref 0–0.05)
IMM GRANULOCYTES # BLD AUTO: 0.03 10*3/MM3 (ref 0–0.05)
IMM GRANULOCYTES NFR BLD AUTO: 0.3 % (ref 0–0.5)
IMM GRANULOCYTES NFR BLD AUTO: 0.4 % (ref 0–0.5)
KETONES UR QL STRIP: ABNORMAL
LDLC SERPL CALC-MCNC: 47 MG/DL (ref 0–100)
LDLC/HDLC SERPL: 0.94 {RATIO}
LEUKOCYTE ESTERASE UR QL STRIP.AUTO: NEGATIVE
LYMPHOCYTES # BLD AUTO: 1.51 10*3/MM3 (ref 0.7–3.1)
LYMPHOCYTES # BLD AUTO: 3.4 10*3/MM3 (ref 0.7–3.1)
LYMPHOCYTES NFR BLD AUTO: 23.6 % (ref 19.6–45.3)
LYMPHOCYTES NFR BLD AUTO: 43 % (ref 19.6–45.3)
MCH RBC QN AUTO: 31.3 PG (ref 26.6–33)
MCH RBC QN AUTO: 32 PG (ref 26.6–33)
MCHC RBC AUTO-ENTMCNC: 33.6 G/DL (ref 31.5–35.7)
MCHC RBC AUTO-ENTMCNC: 34.4 G/DL (ref 31.5–35.7)
MCV RBC AUTO: 93.2 FL (ref 79–97)
MCV RBC AUTO: 93.2 FL (ref 79–97)
MONOCYTES # BLD AUTO: 0.44 10*3/MM3 (ref 0.1–0.9)
MONOCYTES # BLD AUTO: 0.71 10*3/MM3 (ref 0.1–0.9)
MONOCYTES NFR BLD AUTO: 6.9 % (ref 5–12)
MONOCYTES NFR BLD AUTO: 9 % (ref 5–12)
NEUTROPHILS NFR BLD AUTO: 3.65 10*3/MM3 (ref 1.7–7)
NEUTROPHILS NFR BLD AUTO: 4.39 10*3/MM3 (ref 1.7–7)
NEUTROPHILS NFR BLD AUTO: 46.2 % (ref 42.7–76)
NEUTROPHILS NFR BLD AUTO: 68.6 % (ref 42.7–76)
NITRITE UR QL STRIP: NEGATIVE
NRBC BLD AUTO-RTO: 0 /100 WBC (ref 0–0.2)
NRBC BLD AUTO-RTO: 0 /100 WBC (ref 0–0.2)
PH UR STRIP.AUTO: 7 [PH] (ref 5–8)
PLATELET # BLD AUTO: 237 10*3/MM3 (ref 140–450)
PLATELET # BLD AUTO: 317 10*3/MM3 (ref 140–450)
PMV BLD AUTO: 9.3 FL (ref 6–12)
PMV BLD AUTO: 9.8 FL (ref 6–12)
POTASSIUM SERPL-SCNC: 3.9 MMOL/L (ref 3.5–5.2)
PROT UR QL STRIP: NEGATIVE
RBC # BLD AUTO: 4.12 10*6/MM3 (ref 4.14–5.8)
RBC # BLD AUTO: 4.82 10*6/MM3 (ref 4.14–5.8)
SARS-COV-2 RNA PNL SPEC NAA+PROBE: NOT DETECTED
SODIUM SERPL-SCNC: 135 MMOL/L (ref 136–145)
SP GR UR STRIP: 1.04 (ref 1–1.03)
TRIGL SERPL-MCNC: 133 MG/DL (ref 0–150)
TROPONIN T SERPL-MCNC: 0.02 NG/ML (ref 0–0.03)
UROBILINOGEN UR QL STRIP: ABNORMAL
VLDLC SERPL-MCNC: 23 MG/DL (ref 5–40)
WBC NRBC COR # BLD: 6.4 10*3/MM3 (ref 3.4–10.8)
WBC NRBC COR # BLD: 7.9 10*3/MM3 (ref 3.4–10.8)
WHOLE BLOOD HOLD SPECIMEN: NORMAL
WHOLE BLOOD HOLD SPECIMEN: NORMAL

## 2022-04-18 PROCEDURE — 99233 SBSQ HOSP IP/OBS HIGH 50: CPT | Performed by: PSYCHIATRY & NEUROLOGY

## 2022-04-18 PROCEDURE — 82962 GLUCOSE BLOOD TEST: CPT

## 2022-04-18 PROCEDURE — 81003 URINALYSIS AUTO W/O SCOPE: CPT

## 2022-04-18 PROCEDURE — 63710000001 INSULIN LISPRO (HUMAN) PER 5 UNITS: Performed by: NURSE PRACTITIONER

## 2022-04-18 PROCEDURE — 97116 GAIT TRAINING THERAPY: CPT

## 2022-04-18 PROCEDURE — 85025 COMPLETE CBC W/AUTO DIFF WBC: CPT | Performed by: NURSE PRACTITIONER

## 2022-04-18 PROCEDURE — 92523 SPEECH SOUND LANG COMPREHEN: CPT

## 2022-04-18 PROCEDURE — 80061 LIPID PANEL: CPT

## 2022-04-18 PROCEDURE — 80048 BASIC METABOLIC PNL TOTAL CA: CPT | Performed by: NURSE PRACTITIONER

## 2022-04-18 PROCEDURE — 70551 MRI BRAIN STEM W/O DYE: CPT

## 2022-04-18 PROCEDURE — 87636 SARSCOV2 & INF A&B AMP PRB: CPT | Performed by: NURSE PRACTITIONER

## 2022-04-18 PROCEDURE — 93306 TTE W/DOPPLER COMPLETE: CPT

## 2022-04-18 PROCEDURE — 97161 PT EVAL LOW COMPLEX 20 MIN: CPT

## 2022-04-18 PROCEDURE — 99234 HOSP IP/OBS SM DT SF/LOW 45: CPT | Performed by: HOSPITALIST

## 2022-04-18 PROCEDURE — 97165 OT EVAL LOW COMPLEX 30 MIN: CPT

## 2022-04-18 PROCEDURE — 83036 HEMOGLOBIN GLYCOSYLATED A1C: CPT

## 2022-04-18 RX ORDER — NICOTINE POLACRILEX 4 MG
15 LOZENGE BUCCAL
Status: DISCONTINUED | OUTPATIENT
Start: 2022-04-18 | End: 2022-04-18 | Stop reason: HOSPADM

## 2022-04-18 RX ORDER — TAMSULOSIN HYDROCHLORIDE 0.4 MG/1
1 CAPSULE ORAL DAILY
COMMUNITY

## 2022-04-18 RX ORDER — ATORVASTATIN CALCIUM 80 MG/1
80 TABLET, FILM COATED ORAL NIGHTLY
Qty: 30 TABLET | Refills: 0 | Status: SHIPPED | OUTPATIENT
Start: 2022-04-18 | End: 2022-05-18

## 2022-04-18 RX ORDER — ATORVASTATIN CALCIUM 40 MG/1
80 TABLET, FILM COATED ORAL NIGHTLY
Status: DISCONTINUED | OUTPATIENT
Start: 2022-04-18 | End: 2022-04-18 | Stop reason: HOSPADM

## 2022-04-18 RX ORDER — SODIUM CHLORIDE 0.9 % (FLUSH) 0.9 %
10 SYRINGE (ML) INJECTION AS NEEDED
Status: DISCONTINUED | OUTPATIENT
Start: 2022-04-18 | End: 2022-04-18 | Stop reason: HOSPADM

## 2022-04-18 RX ORDER — SODIUM CHLORIDE 0.9 % (FLUSH) 0.9 %
10 SYRINGE (ML) INJECTION EVERY 12 HOURS SCHEDULED
Status: DISCONTINUED | OUTPATIENT
Start: 2022-04-18 | End: 2022-04-18 | Stop reason: HOSPADM

## 2022-04-18 RX ORDER — ATORVASTATIN CALCIUM 40 MG/1
40 TABLET, FILM COATED ORAL NIGHTLY
Status: DISCONTINUED | OUTPATIENT
Start: 2022-04-18 | End: 2022-04-18

## 2022-04-18 RX ORDER — DEXTROSE MONOHYDRATE 25 G/50ML
25 INJECTION, SOLUTION INTRAVENOUS
Status: DISCONTINUED | OUTPATIENT
Start: 2022-04-18 | End: 2022-04-18 | Stop reason: HOSPADM

## 2022-04-18 RX ORDER — MENTHOL AND METHYL SALICYLATE 10; 30 G/100G; G/100G
1 CREAM TOPICAL 3 TIMES DAILY PRN
COMMUNITY

## 2022-04-18 RX ORDER — ACETAMINOPHEN 325 MG/1
650 TABLET ORAL EVERY 4 HOURS PRN
Status: DISCONTINUED | OUTPATIENT
Start: 2022-04-18 | End: 2022-04-18 | Stop reason: HOSPADM

## 2022-04-18 RX ORDER — ACETAMINOPHEN 160 MG/5ML
650 SOLUTION ORAL EVERY 4 HOURS PRN
Status: DISCONTINUED | OUTPATIENT
Start: 2022-04-18 | End: 2022-04-18 | Stop reason: HOSPADM

## 2022-04-18 RX ORDER — SODIUM CHLORIDE, SODIUM LACTATE, POTASSIUM CHLORIDE, CALCIUM CHLORIDE 600; 310; 30; 20 MG/100ML; MG/100ML; MG/100ML; MG/100ML
75 INJECTION, SOLUTION INTRAVENOUS CONTINUOUS
Status: ACTIVE | OUTPATIENT
Start: 2022-04-18 | End: 2022-04-18

## 2022-04-18 RX ORDER — GUAIFENESIN 600 MG/1
600 TABLET, EXTENDED RELEASE ORAL NIGHTLY
COMMUNITY

## 2022-04-18 RX ORDER — METOPROLOL TARTRATE 50 MG/1
50 TABLET, FILM COATED ORAL 2 TIMES DAILY
COMMUNITY

## 2022-04-18 RX ORDER — ASPIRIN 81 MG/1
81 TABLET, CHEWABLE ORAL DAILY
Status: DISCONTINUED | OUTPATIENT
Start: 2022-04-18 | End: 2022-04-18 | Stop reason: HOSPADM

## 2022-04-18 RX ORDER — SIMVASTATIN 10 MG
20 TABLET ORAL NIGHTLY
COMMUNITY
End: 2022-04-18 | Stop reason: HOSPADM

## 2022-04-18 RX ORDER — ASPIRIN 81 MG/1
81 TABLET, CHEWABLE ORAL DAILY
COMMUNITY

## 2022-04-18 RX ORDER — GABAPENTIN 600 MG/1
600 TABLET ORAL 2 TIMES DAILY
COMMUNITY

## 2022-04-18 RX ORDER — DUTASTERIDE 0.5 MG/1
0.5 CAPSULE, LIQUID FILLED ORAL DAILY
COMMUNITY

## 2022-04-18 RX ORDER — CHOLECALCIFEROL (VITAMIN D3) 125 MCG
5 CAPSULE ORAL NIGHTLY PRN
COMMUNITY

## 2022-04-18 RX ORDER — FLUTICASONE PROPIONATE 50 MCG
1 SPRAY, SUSPENSION (ML) NASAL DAILY
COMMUNITY

## 2022-04-18 RX ORDER — ACETAMINOPHEN 650 MG/1
650 SUPPOSITORY RECTAL EVERY 4 HOURS PRN
Status: DISCONTINUED | OUTPATIENT
Start: 2022-04-18 | End: 2022-04-18 | Stop reason: HOSPADM

## 2022-04-18 RX ADMIN — Medication 10 ML: at 08:25

## 2022-04-18 RX ADMIN — SODIUM CHLORIDE 1000 ML: 9 INJECTION, SOLUTION INTRAVENOUS at 00:23

## 2022-04-18 RX ADMIN — APIXABAN 5 MG: 5 TABLET, FILM COATED ORAL at 08:25

## 2022-04-18 RX ADMIN — ASPIRIN 81 MG 81 MG: 81 TABLET ORAL at 08:25

## 2022-04-18 RX ADMIN — SODIUM CHLORIDE, POTASSIUM CHLORIDE, SODIUM LACTATE AND CALCIUM CHLORIDE 75 ML/HR: 600; 310; 30; 20 INJECTION, SOLUTION INTRAVENOUS at 05:16

## 2022-04-18 RX ADMIN — RIVAROXABAN 15 MG: 15 TABLET, FILM COATED ORAL at 17:24

## 2022-04-18 RX ADMIN — SODIUM CHLORIDE, POTASSIUM CHLORIDE, SODIUM LACTATE AND CALCIUM CHLORIDE 75 ML/HR: 600; 310; 30; 20 INJECTION, SOLUTION INTRAVENOUS at 12:45

## 2022-04-18 RX ADMIN — Medication 10 ML: at 08:26

## 2022-04-18 RX ADMIN — INSULIN LISPRO 2 UNITS: 100 INJECTION, SOLUTION INTRAVENOUS; SUBCUTANEOUS at 12:35

## 2022-04-20 ENCOUNTER — TELEPHONE (OUTPATIENT)
Dept: NEUROLOGY | Facility: OTHER | Age: 82
End: 2022-04-20

## 2022-04-20 NOTE — TELEPHONE ENCOUNTER
PT'S DAUGHTER, WALDEMAR, CALLED TO GET PT'S DISCHARGE NOTES THAT STATED THE PT WAS HOSPITALIZED AND NEEDED TO HAVE A HOSP F/U.    GAVE HER THE DENISE PHONE NO AND ALSO SUGGESTED THE HOSP DISCHARGE NOTES.

## 2022-04-23 LAB
QT INTERVAL: 376 MS
QTC INTERVAL: 452 MS

## (undated) DEVICE — SYR 50ML SLIP TIP NSAF LF STRL --

## (undated) DEVICE — KENDALL RADIOLUCENT FOAM MONITORING ELECTRODE RECTANGULAR SHAPE: Brand: KENDALL

## (undated) DEVICE — CONNECTOR TBNG OD5-7MM O2 END DISP

## (undated) DEVICE — BLOCK BITE AD 60FR W/ VELC STRP ADDRESSES MOST PT AND

## (undated) DEVICE — CANNULA NSL ORAL AD FOR CAPNOFLEX CO2 O2 AIRLFE